# Patient Record
Sex: FEMALE | Race: WHITE | NOT HISPANIC OR LATINO | Employment: OTHER | URBAN - METROPOLITAN AREA
[De-identification: names, ages, dates, MRNs, and addresses within clinical notes are randomized per-mention and may not be internally consistent; named-entity substitution may affect disease eponyms.]

---

## 2017-03-19 ENCOUNTER — HOSPITAL ENCOUNTER (EMERGENCY)
Facility: HOSPITAL | Age: 82
Discharge: HOME | End: 2017-03-19
Attending: EMERGENCY MEDICINE | Admitting: EMERGENCY MEDICINE
Payer: COMMERCIAL

## 2017-03-19 VITALS
HEART RATE: 68 BPM | SYSTOLIC BLOOD PRESSURE: 168 MMHG | WEIGHT: 120 LBS | RESPIRATION RATE: 18 BRPM | OXYGEN SATURATION: 95 % | TEMPERATURE: 97.4 F | DIASTOLIC BLOOD PRESSURE: 87 MMHG | BODY MASS INDEX: 21.95 KG/M2

## 2017-03-19 DIAGNOSIS — R45.1 AGITATION: Primary | ICD-10-CM

## 2017-03-19 DIAGNOSIS — R41.0 DELIRIUM: ICD-10-CM

## 2017-03-19 LAB
BACTERIA UR QL AUTO: NORMAL /HPF
BILIRUB UR QL STRIP: NEGATIVE
CLARITY UR: CLEAR
COLOR UR: YELLOW
GLUCOSE UR STRIP-MCNC: NEGATIVE MG/DL
HGB UR QL STRIP.AUTO: NEGATIVE
KETONES UR STRIP-MCNC: NEGATIVE MG/DL
LEUKOCYTE ESTERASE UR QL STRIP: NEGATIVE
NITRITE UR QL STRIP: NEGATIVE
NON-SQ EPI CELLS URNS QL MICRO: NORMAL /HPF
PH UR STRIP.AUTO: 7 [PH] (ref 4.5–8)
PROT UR STRIP-MCNC: ABNORMAL MG/DL
RBC #/AREA URNS AUTO: NORMAL /HPF
SP GR UR STRIP.AUTO: 1.02 (ref 1–1.03)
UROBILINOGEN UR QL STRIP.AUTO: 2 E.U./DL
WBC #/AREA URNS AUTO: NORMAL /HPF

## 2017-03-19 PROCEDURE — 99284 EMERGENCY DEPT VISIT MOD MDM: CPT

## 2017-03-19 PROCEDURE — 81001 URINALYSIS AUTO W/SCOPE: CPT

## 2017-03-19 PROCEDURE — 81002 URINALYSIS NONAUTO W/O SCOPE: CPT | Performed by: EMERGENCY MEDICINE

## 2017-03-19 RX ORDER — TROLAMINE SALICYLATE 10 G/100G
CREAM TOPICAL AS NEEDED
COMMUNITY
End: 2017-12-22

## 2017-03-19 RX ORDER — ACETAMINOPHEN 325 MG/1
650 TABLET ORAL EVERY 6 HOURS PRN
COMMUNITY
End: 2018-10-02 | Stop reason: SDUPTHER

## 2017-03-19 RX ORDER — AMLODIPINE BESYLATE 2.5 MG/1
2.5 TABLET ORAL 2 TIMES DAILY
COMMUNITY
End: 2017-12-22

## 2017-05-07 ENCOUNTER — HOSPITAL ENCOUNTER (EMERGENCY)
Facility: HOSPITAL | Age: 82
Discharge: NURSING, REHAB OR LONG-TERM CARE | End: 2017-05-07
Attending: EMERGENCY MEDICINE | Admitting: EMERGENCY MEDICINE
Payer: COMMERCIAL

## 2017-05-07 ENCOUNTER — APPOINTMENT (EMERGENCY)
Dept: CT IMAGING | Facility: HOSPITAL | Age: 82
End: 2017-05-07
Payer: COMMERCIAL

## 2017-05-07 ENCOUNTER — APPOINTMENT (EMERGENCY)
Dept: RADIOLOGY | Facility: HOSPITAL | Age: 82
End: 2017-05-07
Payer: COMMERCIAL

## 2017-05-07 VITALS
OXYGEN SATURATION: 93 % | SYSTOLIC BLOOD PRESSURE: 102 MMHG | WEIGHT: 144.62 LBS | BODY MASS INDEX: 26.45 KG/M2 | HEART RATE: 84 BPM | DIASTOLIC BLOOD PRESSURE: 60 MMHG | RESPIRATION RATE: 18 BRPM | TEMPERATURE: 98.1 F

## 2017-05-07 DIAGNOSIS — S00.03XA SCALP HEMATOMA, INITIAL ENCOUNTER: Primary | ICD-10-CM

## 2017-05-07 DIAGNOSIS — S40.019A SHOULDER CONTUSION: ICD-10-CM

## 2017-05-07 PROCEDURE — 72070 X-RAY EXAM THORAC SPINE 2VWS: CPT

## 2017-05-07 PROCEDURE — 70450 CT HEAD/BRAIN W/O DYE: CPT

## 2017-05-07 PROCEDURE — 72125 CT NECK SPINE W/O DYE: CPT

## 2017-05-07 PROCEDURE — 73030 X-RAY EXAM OF SHOULDER: CPT

## 2017-05-07 PROCEDURE — 99284 EMERGENCY DEPT VISIT MOD MDM: CPT

## 2017-05-07 PROCEDURE — 71020 HB CHEST X-RAY 2VW FRONTAL&LATL: CPT

## 2017-05-07 RX ORDER — FUROSEMIDE 40 MG/1
40 TABLET ORAL
COMMUNITY

## 2017-05-07 RX ORDER — ACETAMINOPHEN 325 MG/1
650 TABLET ORAL ONCE
Status: COMPLETED | OUTPATIENT
Start: 2017-05-07 | End: 2017-05-07

## 2017-05-07 RX ORDER — FUROSEMIDE 40 MG/1
40 TABLET ORAL DAILY PRN
COMMUNITY
End: 2017-12-22

## 2017-05-07 RX ADMIN — ACETAMINOPHEN 650 MG: 325 TABLET ORAL at 02:28

## 2017-12-15 ENCOUNTER — GENERIC CONVERSION - ENCOUNTER (OUTPATIENT)
Dept: OTHER | Facility: OTHER | Age: 82
End: 2017-12-15

## 2017-12-22 ENCOUNTER — HOSPITAL ENCOUNTER (OUTPATIENT)
Facility: HOSPITAL | Age: 82
Setting detail: OBSERVATION
Discharge: HOME | End: 2017-12-23
Attending: EMERGENCY MEDICINE | Admitting: HOSPITALIST
Payer: COMMERCIAL

## 2017-12-22 ENCOUNTER — APPOINTMENT (EMERGENCY)
Dept: RADIOLOGY | Facility: HOSPITAL | Age: 82
End: 2017-12-22
Payer: COMMERCIAL

## 2017-12-22 ENCOUNTER — APPOINTMENT (EMERGENCY)
Dept: CT IMAGING | Facility: HOSPITAL | Age: 82
End: 2017-12-22
Payer: COMMERCIAL

## 2017-12-22 DIAGNOSIS — I50.9 CONGESTIVE HEART FAILURE (CHF) (HCC): Primary | ICD-10-CM

## 2017-12-22 LAB
ALBUMIN SERPL BCP-MCNC: 3.8 G/DL (ref 3.5–5)
ALP SERPL-CCNC: 119 U/L (ref 46–116)
ALT SERPL W P-5'-P-CCNC: 27 U/L (ref 12–78)
ANION GAP SERPL CALCULATED.3IONS-SCNC: 6 MMOL/L (ref 4–13)
AST SERPL W P-5'-P-CCNC: 32 U/L (ref 5–45)
ATRIAL RATE: 65 BPM
BASOPHILS # BLD AUTO: 0.03 THOUSANDS/ΜL (ref 0–0.1)
BASOPHILS NFR BLD AUTO: 1 % (ref 0–1)
BILIRUB SERPL-MCNC: 0.6 MG/DL (ref 0.2–1)
BUN SERPL-MCNC: 16 MG/DL (ref 5–25)
CALCIUM SERPL-MCNC: 8.9 MG/DL (ref 8.3–10.1)
CHLORIDE SERPL-SCNC: 103 MMOL/L (ref 100–108)
CO2 SERPL-SCNC: 33 MMOL/L (ref 21–32)
CREAT SERPL-MCNC: 0.89 MG/DL (ref 0.6–1.3)
EOSINOPHIL # BLD AUTO: 0.1 THOUSAND/ΜL (ref 0–0.61)
EOSINOPHIL NFR BLD AUTO: 2 % (ref 0–6)
ERYTHROCYTE [DISTWIDTH] IN BLOOD BY AUTOMATED COUNT: 13.8 % (ref 11.6–15.1)
GFR SERPL CREATININE-BSD FRML MDRD: 57 ML/MIN/1.73SQ M
GLUCOSE SERPL-MCNC: 87 MG/DL (ref 65–140)
HCT VFR BLD AUTO: 39.6 % (ref 34.8–46.1)
HGB BLD-MCNC: 12.6 G/DL (ref 11.5–15.4)
LYMPHOCYTES # BLD AUTO: 0.63 THOUSANDS/ΜL (ref 0.6–4.47)
LYMPHOCYTES NFR BLD AUTO: 13 % (ref 14–44)
MAGNESIUM SERPL-MCNC: 2.3 MG/DL (ref 1.6–2.6)
MCH RBC QN AUTO: 29.9 PG (ref 26.8–34.3)
MCHC RBC AUTO-ENTMCNC: 31.8 G/DL (ref 31.4–37.4)
MCV RBC AUTO: 94 FL (ref 82–98)
MONOCYTES # BLD AUTO: 0.52 THOUSAND/ΜL (ref 0.17–1.22)
MONOCYTES NFR BLD AUTO: 11 % (ref 4–12)
NEUTROPHILS # BLD AUTO: 3.66 THOUSANDS/ΜL (ref 1.85–7.62)
NEUTS SEG NFR BLD AUTO: 73 % (ref 43–75)
NT-PROBNP SERPL-MCNC: 2239 PG/ML
P AXIS: 88 DEGREES
PLATELET # BLD AUTO: 139 THOUSANDS/UL (ref 149–390)
PMV BLD AUTO: 12.4 FL (ref 8.9–12.7)
POTASSIUM SERPL-SCNC: 3.6 MMOL/L (ref 3.5–5.3)
PR INTERVAL: 304 MS
PROT SERPL-MCNC: 7.1 G/DL (ref 6.4–8.2)
QRS AXIS: -25 DEGREES
QRSD INTERVAL: 128 MS
QT INTERVAL: 442 MS
QTC INTERVAL: 459 MS
RBC # BLD AUTO: 4.21 MILLION/UL (ref 3.81–5.12)
SODIUM SERPL-SCNC: 142 MMOL/L (ref 136–145)
T WAVE AXIS: 77 DEGREES
TROPONIN I SERPL-MCNC: 0.03 NG/ML
VENTRICULAR RATE: 65 BPM
WBC # BLD AUTO: 4.94 THOUSAND/UL (ref 4.31–10.16)

## 2017-12-22 PROCEDURE — 84484 ASSAY OF TROPONIN QUANT: CPT | Performed by: EMERGENCY MEDICINE

## 2017-12-22 PROCEDURE — 85025 COMPLETE CBC W/AUTO DIFF WBC: CPT | Performed by: HOSPITALIST

## 2017-12-22 PROCEDURE — 93005 ELECTROCARDIOGRAM TRACING: CPT

## 2017-12-22 PROCEDURE — 96374 THER/PROPH/DIAG INJ IV PUSH: CPT

## 2017-12-22 PROCEDURE — 36415 COLL VENOUS BLD VENIPUNCTURE: CPT | Performed by: EMERGENCY MEDICINE

## 2017-12-22 PROCEDURE — 93005 ELECTROCARDIOGRAM TRACING: CPT | Performed by: EMERGENCY MEDICINE

## 2017-12-22 PROCEDURE — 71020 HB CHEST X-RAY 2VW FRONTAL&LATL: CPT

## 2017-12-22 PROCEDURE — 80053 COMPREHEN METABOLIC PANEL: CPT | Performed by: EMERGENCY MEDICINE

## 2017-12-22 PROCEDURE — 83735 ASSAY OF MAGNESIUM: CPT | Performed by: EMERGENCY MEDICINE

## 2017-12-22 PROCEDURE — 99285 EMERGENCY DEPT VISIT HI MDM: CPT

## 2017-12-22 PROCEDURE — 83880 ASSAY OF NATRIURETIC PEPTIDE: CPT | Performed by: EMERGENCY MEDICINE

## 2017-12-22 PROCEDURE — 70450 CT HEAD/BRAIN W/O DYE: CPT

## 2017-12-22 RX ORDER — FUROSEMIDE 10 MG/ML
40 INJECTION INTRAMUSCULAR; INTRAVENOUS ONCE
Status: COMPLETED | OUTPATIENT
Start: 2017-12-22 | End: 2017-12-22

## 2017-12-22 RX ORDER — POLYVINYL ALCOHOL 14 MG/ML
1 SOLUTION/ DROPS OPHTHALMIC
Status: DISCONTINUED | OUTPATIENT
Start: 2017-12-22 | End: 2017-12-23 | Stop reason: HOSPADM

## 2017-12-22 RX ORDER — ALBUTEROL SULFATE 2.5 MG/3ML
2.5 SOLUTION RESPIRATORY (INHALATION)
Status: DISCONTINUED | OUTPATIENT
Start: 2017-12-22 | End: 2017-12-22

## 2017-12-22 RX ORDER — ALBUTEROL SULFATE 2.5 MG/3ML
2.5 SOLUTION RESPIRATORY (INHALATION) EVERY 6 HOURS PRN
Status: DISCONTINUED | OUTPATIENT
Start: 2017-12-22 | End: 2017-12-23 | Stop reason: HOSPADM

## 2017-12-22 RX ORDER — ACETAMINOPHEN 325 MG/1
650 TABLET ORAL EVERY 6 HOURS PRN
Status: DISCONTINUED | OUTPATIENT
Start: 2017-12-22 | End: 2017-12-23 | Stop reason: HOSPADM

## 2017-12-22 RX ORDER — ASPIRIN 81 MG/1
81 TABLET, CHEWABLE ORAL DAILY
Status: DISCONTINUED | OUTPATIENT
Start: 2017-12-22 | End: 2017-12-23 | Stop reason: HOSPADM

## 2017-12-22 RX ORDER — FUROSEMIDE 40 MG/1
40 TABLET ORAL
Status: DISCONTINUED | OUTPATIENT
Start: 2017-12-22 | End: 2017-12-23

## 2017-12-22 RX ORDER — BUSPIRONE HYDROCHLORIDE 5 MG/1
7.5 TABLET ORAL 2 TIMES DAILY
Status: DISCONTINUED | OUTPATIENT
Start: 2017-12-22 | End: 2017-12-23 | Stop reason: HOSPADM

## 2017-12-22 RX ORDER — BUSPIRONE HYDROCHLORIDE 7.5 MG/1
7.5 TABLET ORAL 2 TIMES DAILY
COMMUNITY

## 2017-12-22 RX ADMIN — BUSPIRONE HYDROCHLORIDE 7.5 MG: 5 TABLET ORAL at 17:34

## 2017-12-22 RX ADMIN — METOPROLOL TARTRATE 12.5 MG: 25 TABLET ORAL at 20:35

## 2017-12-22 RX ADMIN — SERTRALINE HYDROCHLORIDE 50 MG: 50 TABLET ORAL at 11:07

## 2017-12-22 RX ADMIN — BUSPIRONE HYDROCHLORIDE 7.5 MG: 5 TABLET ORAL at 11:07

## 2017-12-22 RX ADMIN — FUROSEMIDE 40 MG: 40 TABLET ORAL at 17:35

## 2017-12-22 RX ADMIN — METOPROLOL TARTRATE 12.5 MG: 25 TABLET ORAL at 11:07

## 2017-12-22 RX ADMIN — ENOXAPARIN SODIUM 40 MG: 40 INJECTION SUBCUTANEOUS at 14:13

## 2017-12-22 RX ADMIN — ASPIRIN 81 MG 81 MG: 81 TABLET ORAL at 11:07

## 2017-12-22 RX ADMIN — FUROSEMIDE 40 MG: 10 INJECTION, SOLUTION INTRAMUSCULAR; INTRAVENOUS at 07:49

## 2017-12-22 NOTE — CASE MANAGEMENT
Initial Clinical Review    Admission: Date/Time/Statement: 12/22/2017  0810 OBSERVATION    Orders Placed This Encounter   Procedures    Place in Observation (expected length of stay for this patient is less than two midnights)     Standing Status:   Standing     Number of Occurrences:   1     Order Specific Question:   Admitting Physician     Answer:   Wilmar De La Torre [[id number]]     Order Specific Question:   Level of Care     Answer:   Med Surg [16]         ED: Date/Time/Mode of Arrival:   ED Arrival Information     Expected Arrival Acuity Means of Arrival Escorted By Service Admission Type    - 12/22/2017 05:12 Urgent Ambulance [facility] General Medicine Urgent    Arrival Complaint    SOB          Chief Complaint:   Chief Complaint   Patient presents with    Breathing Difficulty     Pt arrived to ED via EMS from [facility] where staff found patient in bed and wheezing  EMS gave 2 5mg of albuterol, pt has Hx of CHF       History of Illness:  80 y o  female h/o AoCDHF, AS, mitral stenosis, and dementia  who presents with acute onset of SOB and wheezing this am while at her nursing home  No clear antecedent to symptoms  No obvious sick contacts  No med noncompliance or clear dietary indiscretion  No cough  No fevers or chills  Pt is a poor historian   Reportedly improved with nebulizers en route to ED   +LE edema, but may be improved from days prior per family at bedside    ED Vital Signs:   ED Triage Vitals   Temperature Pulse Respirations Blood Pressure SpO2   12/22/17 0525 12/22/17 0522 12/22/17 0524 12/22/17 0522 12/22/17 0522   97 6 °F (36 4 °C) 64 18 157/70 93 %      Temp Source Heart Rate Source Patient Position - Orthostatic VS BP Location FiO2 (%)   12/22/17 0525 12/22/17 0522 12/22/17 0522 12/22/17 0522 --   Oral Monitor Lying Right arm       Pain Score       12/22/17 0522       No Pain        Wt Readings from Last 1 Encounters:   12/22/17 56 4 kg (124 lb 5 4 oz)       Vital Signs (abnormal): low sat of 92% room air  Exam - frail  JVD  Murmur - harsh JERRY at LUSB  Edema  Abnormal Labs/Diagnostic Test Results:   NT-proBNP 2239  CO2-33  Alkaline phosphatase 119    Ct head - No acute intracranial abnormality  CxR- cardiomegaly    EKG- Sinus rhythm with 1st degree A-V block  Left ventricular hypertrophy with QRS widening and repolarization abnormality  Cannot rule out Septal infarct (cited on or before 21-JAN-2005)  Abnormal ECG  When compared with ECG of 30-OCT-2016 00:43,  T wave inversion no longer evident in Inferior leads  T wave inversion more evident in Lateral leads    ED Treatment:   Medication Administration from 12/22/2017 0512 to 12/22/2017 9269       Date/Time Order Dose Route Action Action by Comments     12/22/2017 0529  EMS REPLENISHMENT MED 0  Does not apply Given to [address], RN      12/22/2017 0749 furosemide (LASIX) injection 40 mg 40 mg Intravenous Given Shaun Ortega, RN           Past Medical/Surgical History:    Active Ambulatory Problems     Diagnosis Date Noted    Essential hypertension 04/08/2016    Shortness of breath 08/20/2016    Lung nodules 08/21/2016    Acute on chronic diastolic heart failure ([facility] [address] ) 08/21/2016    Dementia 08/21/2016    EKG abnormalities 08/26/2016    Chronic fracture 08/26/2016     Resolved Ambulatory Problems     Diagnosis Date Noted    Chest pain 04/08/2016    Recent bereavement 04/08/2016    Anxiety 04/08/2016    Acute bacterial bronchitis 05/29/2016    Congestive heart failure ([facility] [address] ) 05/29/2016    Fall 08/26/2016    Hyperkalemia 08/26/2016    Thrombocytopenia ([facility] [address] ) 08/26/2016    Weight gain 08/26/2016    Contusion of right elbow 08/26/2016    Head contusion 08/26/2016    Contusion of right hip 08/26/2016     Past Medical History:   Diagnosis Date    CHF (congestive heart failure) (HCC)     Dementia     Diverticulitis     Fx ankle     Fx wrist     Hernia     Hypertension     Moderate intellectual disabilities  Osteoarthritis     Osteoarthritis        Admitting Diagnosis: Shortness of breath [R06 02]  Congestive heart failure (CHF) ([facility] [address] ) [I50 9]    Age/Sex: 80 y o  female    Assessment/Plan:   Shortness of breath   Assessment & Plan     Most evidence points to HF  Continue nebulizers       Acute on chronic diastolic heart failure (HCC)   Assessment & Plan     Mildly volume overloaded on exam  S/p 1 dose of lasix 40mg  Will likely resume home dosing this afternoon  I/Os  Daily weights  Telemetry           Essential hypertension   Assessment & Plan     BP stable  Resume home meds        Dementia   Assessment & Plan     Delirium precautions; frequent reorientation          Admission Orders:  TELE  12/22/2017  0810 OBSERVATION  Scheduled Meds:   aspirin 81 mg Oral Daily   busPIRone 7 5 mg Oral BID   enoxaparin 40 mg Subcutaneous Daily   furosemide 40 mg Oral Before Dinner   metoprolol tartrate 12 5 mg Oral Q12H [address]   sertraline 50 mg Oral Daily     Continuous Infusions:    PRN Meds: not used:    acetaminophen    albuterol    polyvinyl alcohol    OTHER ORDERS: scds      Thank you,  [facility] in the [facility] by Sukumar Tran for 2017  Network Utilization Review Department  Phone: [phone]; Fax [phone]  ATTENTION: The Network Utilization Review Department is now centralized for our 7 Facilities  Make a note that we have a new phone and fax numbers for our Department  Please call with any questions or concerns to [phone] and carefully follow the prompts so that you are directed to the right person  All voicemails are confidential  Fax any determinations, approvals, denials, and requests for initial or continue stay review clinical to [phone]  Due to HIGH CALL volume, it would be easier if you could please send faxed requests to expedite your requests and in part, help us provide discharge notifications faster

## 2017-12-22 NOTE — ASSESSMENT & PLAN NOTE
Mildly volume overloaded on exam  S/p 1 dose of lasix 40mg  Will likely resume home dosing this afternoon  I/Os  Daily weights  Telemetry

## 2017-12-22 NOTE — H&P
H&P- Sierra Read 6/19/1927, 80 y o  female MRN: [id number]    Unit/Bed#: -01 Encounter: [id number]    Primary Care Provider: Vi Lara DO   Date and time admitted to hospital: 12/22/2017  5:13 AM    * Shortness of breath   Assessment & Plan    Most evidence points to HF  Continue nebulizers        Acute on chronic diastolic heart failure (HCC)   Assessment & Plan    Mildly volume overloaded on exam  S/p 1 dose of lasix 40mg  Will likely resume home dosing this afternoon  I/Os  Daily weights  Telemetry           Essential hypertension   Assessment & Plan    BP stable  Resume home meds         Dementia   Assessment & Plan    Delirium precautions; frequent reorientation             VTE Prophylaxis: Enoxaparin (Lovenox)  / sequential compression device   Code Status: DNR/DNI   POLST: POLST form is not discussed and not completed at this time  Anticipated Length of Stay:  Patient will be admitted on an Observation basis with an anticipated length of stay of  Less than 2 midnights  Justification for Hospital Stay: SOB of unclear etiology, suspect HF exacerbation     Total Time for Visit, including Counseling / Coordination of Care: 45 minutes  Greater than 50% of this total time spent on direct patient counseling and coordination of care  Chief Complaint:   SOB, wheeze     History of Present Illness:    Sierra Read is a 80 y o  female h/o AoCDHF, AS, mitral stenosis, and dementia  who presents with acute onset of SOB and wheezing this am while at her nursing home  No clear antecedent to symptoms  No obvious sick contacts  No med noncompliance or clear dietary indiscretion  No cough  No fevers or chills  Pt is a poor historian  Reportedly improved with nebulizers en route to ED   +LE edema, but may be improved from days prior per family at bedside    Review of Systems:    Review of Systems   Constitutional: Negative  Negative for chills and fever  HENT: Negative      Respiratory: Positive for shortness of breath and wheezing  Negative for cough and chest tightness  Cardiovascular: Positive for leg swelling  Negative for chest pain and palpitations  Gastrointestinal: Negative  Genitourinary: Negative  Musculoskeletal: Negative  Neurological: Negative  Psychiatric/Behavioral: Negative  Past Medical and Surgical History:     Past Medical History:   Diagnosis Date    CHF [facility]congestive heart failure) ([facility] [address] )     Dementia     Diverticulitis     Fx ankle     Fx wrist     Hernia     Hypertension     Moderate intellectual disabilities     Osteoarthritis        Past Surgical History:   Procedure Laterality Date    ABDOMINAL SURGERY         Meds/Allergies:    Prior to Admission medications    Medication Sig Start Date End Date Taking? Authorizing Provider   acetaminophen (TYLENOL) 325 mg tablet Take 650 mg by mouth every 6 (six) hours as needed for mild pain   Yes Historical Provider, MD   aspirin 81 mg chewable tablet Chew 81 mg daily  Yes Historical Provider, MD   busPIRone (BUSPAR) 7 5 mg tablet Take 7 5 mg by mouth 2 (two) times a day   Yes Historical Provider, MD   furosemide (LASIX) 20 mg tablet Take 20 mg by mouth daily     Yes Historical Provider, MD   furosemide (LASIX) 40 mg tablet Take 40 mg by mouth daily before dinner     Yes Historical Provider, MD   Polyvinyl Alcohol (LUBRICANT DROPS OP) Apply 1 drop to eye 3 (three) times a day as needed     Yes Historical Provider, MD   sertraline (ZOLOFT) 50 mg tablet Take 50 mg by mouth daily   Yes Historical Provider, MD   amLODIPine (NORVASC) 2 5 mg tablet Take 2 5 mg by mouth 2 (two) times a day Hold if systolic blood pressure is less than 140   12/22/17  Historical Provider, MD   docusate sodium (COLACE) 100 mg capsule Take 100 mg by mouth 2 (two) times a day  12/22/17  Historical Provider, MD   escitalopram (LEXAPRO) 10 mg tablet Take 20 mg by mouth daily      12/22/17  Historical Provider, MD   furosemide (LASIX) 40 mg tablet Take 40 mg by mouth daily as needed  12/22/17  Historical Provider, MD   menthol-zinc oxide (CALMOSEPTINE) 0 44-20 6 % OINT Apply topically 2 (two) times a day  12/22/17  Historical Provider, MD   trolamine salicylate (ASPERCREME) 10 % cream Apply topically as needed for muscle/joint pain  12/22/17  Historical Provider, MD     I have reviewed home medications using allscripts  Allergies: No Known Allergies    Social History:     Marital Status:    Patient Pre-hospital Living Situation: lives at nursing home   Substance Use History:   History   Alcohol Use No     History   Smoking Status    Never Smoker   Smokeless Tobacco    Never Used     History   Drug Use No       Family History:    non-contributory    Physical Exam:     Vitals:   Blood Pressure: 136/68 (12/22/17 0800)  Pulse: 64 (12/22/17 0815)  Temperature: 97 6 °F (36 4 °C) (12/22/17 0525)  Temp Source: Oral (12/22/17 0525)  Respirations: 18 (12/22/17 0524)  Weight - Scale: 56 4 kg (124 lb 5 4 oz) (12/22/17 0522)  SpO2: 96 % (12/22/17 0815)    Physical Exam   Constitutional: She appears well-developed  No distress  Frail elderly woman in NAD   HENT:   Head: Normocephalic and atraumatic  Mouth/Throat: Oropharynx is clear and moist  No oropharyngeal exudate  Neck: JVD present  Cardiovascular: Normal rate and regular rhythm  Murmur (harsh JERRY at LUSB) heard  Pulmonary/Chest: She has no wheezes  She has no rales  Effort was poor, but no discrete abnormal breath sounds    Abdominal: Soft  Bowel sounds are normal  She exhibits no distension  There is no tenderness  There is no rebound and no guarding  Musculoskeletal: She exhibits edema (trace b/l )  Neurological: She is alert  Skin: Skin is warm and dry  She is not diaphoretic  Psychiatric: She has a normal mood and affect  Her behavior is normal    Nursing note and vitals reviewed  Additional Data:     Lab Results: I have personally reviewed pertinent reports  Results from last 7 days  Lab Units 12/22/17  0638   SODIUM mmol/L 142   POTASSIUM mmol/L 3 6   CHLORIDE mmol/L 103   CO2 mmol/L 33*   BUN mg/dL 16   CREATININE mg/dL 0 89   CALCIUM mg/dL 8 9   TOTAL PROTEIN g/dL 7 1   BILIRUBIN TOTAL mg/dL 0 60   ALK PHOS U/L 119*   ALT U/L 27   AST U/L 32   GLUCOSE RANDOM mg/dL 87           Imaging: I have personally reviewed pertinent reports  Xr Chest 2 Views    Result Date: 12/22/2017  Narrative: CHEST INDICATION:  Shortness of breath  Wheezing  COMPARISON:  5/7/2017  VIEWS:  Frontal and lateral projections IMAGES:  2 FINDINGS:     Cardiomegaly is unchanged  Dense mitral annulus calcification again noted  The lungs are clear  No pneumothorax or pleural effusion  Stable moderate compression deformity of a lower thoracic vertebral body, and increased thoracic kyphosis  Impression: No acute pulmonary disease  Cardiomegaly  Workstation performed: [id number]     Ct Head Without Contrast    Result Date: 12/22/2017  Narrative: CT BRAIN - WITHOUT CONTRAST INDICATION:  Vertigo  COMPARISON:  5/7/2017 TECHNIQUE:  CT examination of the brain was performed  In addition to axial images, coronal reformatted images were created and submitted for interpretation  Radiation dose length product (DLP) for this visit:  1324 mGy-cm   This examination, like all CT scans performed in the [facility], was performed utilizing techniques to minimize radiation dose exposure, including the use of iterative reconstruction and automated exposure control  IMAGE QUALITY:  Diagnostic  FINDINGS:  PARENCHYMA:  Decreased attenuation is noted in the supratentorial white matter demonstrating an appearance most consistent with mild microangiopathic change  No intracranial mass, mass effect or midline shift  No CT signs of acute infarction  There is no parenchymal hemorrhage  VENTRICLES AND EXTRA-AXIAL SPACES:  Age-appropriate volume loss is noted  No hydrocephalus  VISUALIZED ORBITS AND PARANASAL SINUSES:  Unremarkable  CALVARIUM AND EXTRACRANIAL SOFT TISSUES:   Normal      Impression: No acute intracranial abnormality  Workstation performed: [id number]       EKG, Pathology, and Other Studies Reviewed on Admission:   · EKG: sinus rhythm at 68    Allscripts / Epic Records Reviewed: Yes     ** Please Note: This note has been constructed using a voice recognition system   **

## 2017-12-22 NOTE — ED PROVIDER NOTES
History  Chief Complaint   Patient presents with    Breathing Difficulty     Pt arrived to ED via EMS [facility] where staff found patient in bed and wheezing  EMS gave 2 5mg of albuterol, pt has Hx of CHF     Pt in ER with c/o resp distress  Per daughters, pt was at a different Nh, and her lasix was discontinued for 6mths  Pt recently moved to a new Nh, and restarted her meds on Monday  Pt was given a bath by staff, became dyspneic afterwards  Medics were called and pt was given a neb tx enroute  By the time of my initial eval, pt's symptoms had improved  Pt has a hx of dementia and is a poor historian            Prior to Admission Medications   Prescriptions Last Dose Informant Patient Reported? Taking? Polyvinyl Alcohol (LUBRICANT DROPS OP)   Yes Yes   Sig: Apply 1 drop to eye 3 (three) times a day as needed     acetaminophen (TYLENOL) 325 mg tablet   Yes Yes   Sig: Take 650 mg by mouth every 6 (six) hours as needed for mild pain   aspirin 81 mg chewable tablet   Yes Yes   Sig: Chew 81 mg daily  busPIRone (BUSPAR) 7 5 mg tablet   Yes Yes   Sig: Take 7 5 mg by mouth 2 (two) times a day   furosemide (LASIX) 20 mg tablet   Yes Yes   Sig: Take 20 mg by mouth daily     furosemide (LASIX) 40 mg tablet   Yes Yes   Sig: Take 40 mg by mouth daily before dinner     sertraline (ZOLOFT) 50 mg tablet   Yes Yes   Sig: Take 50 mg by mouth daily      Facility-Administered Medications: None       Past Medical History:   Diagnosis Date    CHF (congestive heart failure) (HCC)     Dementia     Diverticulitis     Fx ankle     Fx wrist     Hernia     Hypertension     Moderate intellectual disabilities     Osteoarthritis     Osteoarthritis        Past Surgical History:   Procedure Laterality Date    ABDOMINAL SURGERY         History reviewed  No pertinent family history  I have reviewed and agree with the history as documented      Social History   Substance Use Topics    Smoking status: Never Smoker    Smokeless tobacco: Never Used    Alcohol use No        Review of Systems   Unable to perform ROS: Dementia   Respiratory: Positive for shortness of breath and wheezing  Negative for cough  Cardiovascular: Positive for leg swelling  Negative for chest pain  Physical Exam  ED Triage Vitals   Temperature Pulse Respirations Blood Pressure SpO2   12/22/17 0525 12/22/17 0522 12/22/17 0524 12/22/17 0522 12/22/17 0522   97 6 °F (36 4 °C) 64 18 157/70 93 %      Temp Source Heart Rate Source Patient Position - Orthostatic VS BP Location FiO2 (%)   12/22/17 0525 12/22/17 0522 12/22/17 0522 12/22/17 0522 --   Oral Monitor Lying Right arm       Pain Score       12/22/17 0522       No Pain           Orthostatic Vital Signs  Vitals:    12/22/17 0853 12/22/17 1618 12/22/17 2300 12/23/17 0700   BP: 143/69 139/58 98/53 140/63   Pulse: 67 66 60 (!) 52   Patient Position - Orthostatic VS:  Lying Lying Lying       Physical Exam   Constitutional: She appears well-developed and well-nourished  HENT:   Head: Normocephalic and atraumatic  Cardiovascular: Normal rate and regular rhythm  Pulmonary/Chest: She is in respiratory distress  She has decreased breath sounds in the right lower field and the left lower field  Abdominal: Soft  Bowel sounds are normal    Musculoskeletal: She exhibits edema  She exhibits no tenderness or deformity  Neurological: She is alert  No cranial nerve deficit  Skin: Skin is dry  Nursing note and vitals reviewed        ED Medications  Medications    EMS REPLENISHMENT MED ( Does not apply Given to EMS 12/22/17 0529)   furosemide (LASIX) injection 40 mg (40 mg Intravenous Given 12/22/17 0749)       Diagnostic Studies  Results Reviewed     Procedure Component Value Units Date/Time    B-type natriuretic peptide [[id number]]  (Abnormal) Collected:  12/22/17 0954    Lab Status:  Final result Specimen:  Blood from Arm, Right Updated:  12/22/17 0715     NT-proBNP 2,239 (H) pg/mL     Magnesium [[id number]]  (Normal) Collected:  12/22/17 5098    Lab Status:  Final result Specimen:  Blood from Arm, Right Updated:  12/22/17 0715     Magnesium 2 3 mg/dL     Comprehensive metabolic panel [[id number]]  (Abnormal) Collected:  12/22/17 7527    Lab Status:  Final result Specimen:  Blood from Arm, Right Updated:  12/22/17 4637     Sodium 142 mmol/L      Potassium 3 6 mmol/L      Chloride 103 mmol/L      CO2 33 (H) mmol/L      Anion Gap 6 mmol/L      BUN 16 mg/dL      Creatinine 0 89 mg/dL      Glucose 87 mg/dL      Calcium 8 9 mg/dL      AST 32 U/L      ALT 27 U/L      Alkaline Phosphatase 119 (H) U/L      Total Protein 7 1 g/dL      Albumin 3 8 g/dL      Total Bilirubin 0 60 mg/dL      eGFR 57 ml/min/1 73sq m     Narrative:         National Kidney Disease Education Program recommendations are as follows:  GFR calculation is accurate only with a steady state creatinine  Chronic Kidney disease less than 60 ml/min/1 73 sq  meters  Kidney failure less than 15 ml/min/1 73 sq  meters  Troponin I [[id number]]  (Normal) Collected:  12/22/17 3370    Lab Status:  Final result Specimen:  Blood from Arm, Right Updated:  12/22/17 0708     Troponin I 0 03 ng/mL     Narrative:         [employer] Chemistry analyzer 99% cutoff is > 0 04 ng/mL in network labs    o cTnI 99% cutoff is useful only when applied to patients in the clinical setting of myocardial ischemia  o cTnI 99% cutoff should be interpreted in the context of clinical history, ECG findings and possibly cardiac imaging to establish correct diagnosis  o cTnI 99% cutoff may be suggestive but clearly not indicative of a coronary event without the clinical setting of myocardial ischemia  XR chest 2 views   ED Interpretation by Paulette Najera DO (12/22 0702)   Cardiomegaly, mild pulm vasc congestion      Final Result by Valentino Goodwin MD (12/22 7399)      No acute pulmonary disease  Cardiomegaly           Workstation performed: [id number]         CT head without contrast   Final Result by Mohsen Ramirez MD (12/22 1722)      No acute intracranial abnormality  Workstation performed: [id number]                    Procedures  ECG 12 Lead Documentation  Date/Time: 12/22/2017 6:30 AM  Performed by: Leif De by: Carlene Nissen     Indications / Diagnosis:  Sob  ECG reviewed by me, the ED Provider: yes    Patient location:  ED and bedside  Previous ECG:     Previous ECG:  Compared to current    Similarity:  No change  Interpretation:     Interpretation: abnormal    Rate:     ECG rate:  65bpm    ECG rate assessment: normal    Rhythm:     Rhythm: sinus rhythm and A-V block    ST segments:     ST segments:  Non-specific           Phone Contacts  ED Phone Contact    ED Course  ED Course                                MDM  CritCare Time    Disposition  Final diagnoses:   Congestive heart failure (CHF) ([address] )     Time reflects when diagnosis was documented in both MDM as applicable and the Disposition within this note     Time User Action Codes Description Comment    12/22/2017  8:14 AM Xena LAI Add [I50 9] Congestive heart failure (CHF) [facility])       ED Disposition     ED Disposition Condition Comment    Admit  Case was discussed with Adhikari Medico and the patient's admission status was agreed to be Admission Status: observation status to the service of Dr Briana Rosario MD Documentation    [address] Name, [address] [place]      RN Documentation    [address] Name, [address] [place]      Follow-up Information     Follow up With Specialties Details Why Contact Reema Sanders DO Family Medicine Follow up  [address] [facility]  [phone]          Discharge Medication List as of 12/23/2017 11:08 AM      START taking these medications    Details   metoprolol tartrate (LOPRESSOR) 25 mg tablet Take 0 5 tablets by mouth every 12 (twelve) hours, Starting Sat 12/23/2017, Print         CONTINUE these medications which have CHANGED    Details   albuterol (2 5 mg/3 mL) 0 083 % nebulizer solution Take 3 mL by nebulization every 6 (six) hours as needed for shortness of breath, Starting Sat 12/23/2017, Print         CONTINUE these medications which have NOT CHANGED    Details   acetaminophen (TYLENOL) 325 mg tablet Take 650 mg by mouth every 6 (six) hours as needed for mild pain, Until Discontinued, Historical Med      aspirin 81 mg chewable tablet Chew 81 mg daily  , Until Discontinued, Historical Med      busPIRone (BUSPAR) 7 5 mg tablet Take 7 5 mg by mouth 2 (two) times a day, Historical Med      !! furosemide (LASIX) 20 mg tablet Take 20 mg by mouth daily  , Historical Med      !! furosemide (LASIX) 40 mg tablet Take 40 mg by mouth daily before dinner  , Historical Med      Polyvinyl Alcohol (LUBRICANT DROPS OP) Apply 1 drop to eye 3 (three) times a day as needed  , Historical Med      sertraline (ZOLOFT) 50 mg tablet Take 50 mg by mouth daily, Historical Med       !! - Potential duplicate medications found  Please discuss with provider            Outpatient Discharge Orders  Discharge Condtion:  Stabilized     Patient Aware of Diagnosis: No     Free of Communicable Disease:   Yes         ED Provider  Electronically Signed by           Gaby Irvin DO  12/26/17 7682

## 2017-12-22 NOTE — ED NOTES
Daughter- Cirotatiana Hurtadobs phone number [phone]     Timoviri Trey, [address] [place] [address]  12/22/17 9643

## 2017-12-23 VITALS
SYSTOLIC BLOOD PRESSURE: 140 MMHG | OXYGEN SATURATION: 97 % | WEIGHT: 125.22 LBS | DIASTOLIC BLOOD PRESSURE: 63 MMHG | BODY MASS INDEX: 23.04 KG/M2 | RESPIRATION RATE: 18 BRPM | HEIGHT: 62 IN | TEMPERATURE: 98.4 F | HEART RATE: 52 BPM

## 2017-12-23 LAB
ANION GAP SERPL CALCULATED.3IONS-SCNC: 6 MMOL/L (ref 4–13)
BASOPHILS # BLD AUTO: 0.05 THOUSANDS/ΜL (ref 0–0.1)
BASOPHILS NFR BLD AUTO: 1 % (ref 0–1)
BUN SERPL-MCNC: 17 MG/DL (ref 5–25)
CALCIUM SERPL-MCNC: 8.7 MG/DL (ref 8.3–10.1)
CHLORIDE SERPL-SCNC: 103 MMOL/L (ref 100–108)
CO2 SERPL-SCNC: 31 MMOL/L (ref 21–32)
CREAT SERPL-MCNC: 1 MG/DL (ref 0.6–1.3)
EOSINOPHIL # BLD AUTO: 0.13 THOUSAND/ΜL (ref 0–0.61)
EOSINOPHIL NFR BLD AUTO: 2 % (ref 0–6)
ERYTHROCYTE [DISTWIDTH] IN BLOOD BY AUTOMATED COUNT: 14.1 % (ref 11.6–15.1)
GFR SERPL CREATININE-BSD FRML MDRD: 50 ML/MIN/1.73SQ M
GLUCOSE P FAST SERPL-MCNC: 90 MG/DL (ref 65–99)
GLUCOSE SERPL-MCNC: 90 MG/DL (ref 65–140)
HCT VFR BLD AUTO: 42.1 % (ref 34.8–46.1)
HGB BLD-MCNC: 13.4 G/DL (ref 11.5–15.4)
LYMPHOCYTES # BLD AUTO: 1.2 THOUSANDS/ΜL (ref 0.6–4.47)
LYMPHOCYTES NFR BLD AUTO: 20 % (ref 14–44)
MAGNESIUM SERPL-MCNC: 2.4 MG/DL (ref 1.6–2.6)
MCH RBC QN AUTO: 30 PG (ref 26.8–34.3)
MCHC RBC AUTO-ENTMCNC: 31.8 G/DL (ref 31.4–37.4)
MCV RBC AUTO: 94 FL (ref 82–98)
MONOCYTES # BLD AUTO: 0.74 THOUSAND/ΜL (ref 0.17–1.22)
MONOCYTES NFR BLD AUTO: 13 % (ref 4–12)
NEUTROPHILS # BLD AUTO: 3.8 THOUSANDS/ΜL (ref 1.85–7.62)
NEUTS SEG NFR BLD AUTO: 64 % (ref 43–75)
PHOSPHATE SERPL-MCNC: 3.7 MG/DL (ref 2.3–4.1)
PLATELET # BLD AUTO: 131 THOUSANDS/UL (ref 149–390)
PMV BLD AUTO: 12.6 FL (ref 8.9–12.7)
POTASSIUM SERPL-SCNC: 3.6 MMOL/L (ref 3.5–5.3)
RBC # BLD AUTO: 4.46 MILLION/UL (ref 3.81–5.12)
SODIUM SERPL-SCNC: 140 MMOL/L (ref 136–145)
WBC # BLD AUTO: 5.92 THOUSAND/UL (ref 4.31–10.16)

## 2017-12-23 PROCEDURE — 83735 ASSAY OF MAGNESIUM: CPT | Performed by: HOSPITALIST

## 2017-12-23 PROCEDURE — 80048 BASIC METABOLIC PNL TOTAL CA: CPT | Performed by: HOSPITALIST

## 2017-12-23 PROCEDURE — 85025 COMPLETE CBC W/AUTO DIFF WBC: CPT | Performed by: HOSPITALIST

## 2017-12-23 PROCEDURE — 84100 ASSAY OF PHOSPHORUS: CPT | Performed by: HOSPITALIST

## 2017-12-23 RX ORDER — ALBUTEROL SULFATE 2.5 MG/3ML
2.5 SOLUTION RESPIRATORY (INHALATION) EVERY 6 HOURS PRN
Qty: 75 ML | Refills: 0 | Status: SHIPPED | OUTPATIENT
Start: 2017-12-23

## 2017-12-23 RX ORDER — ALBUTEROL SULFATE 2.5 MG/3ML
2.5 SOLUTION RESPIRATORY (INHALATION) EVERY 6 HOURS PRN
Qty: 75 ML | Refills: 0 | Status: SHIPPED | OUTPATIENT
Start: 2017-12-23 | End: 2017-12-23

## 2017-12-23 RX ORDER — FUROSEMIDE 40 MG/1
40 TABLET ORAL DAILY
Status: DISCONTINUED | OUTPATIENT
Start: 2017-12-23 | End: 2017-12-23 | Stop reason: HOSPADM

## 2017-12-23 RX ADMIN — BUSPIRONE HYDROCHLORIDE 7.5 MG: 5 TABLET ORAL at 08:29

## 2017-12-23 RX ADMIN — SERTRALINE HYDROCHLORIDE 50 MG: 50 TABLET ORAL at 08:29

## 2017-12-23 RX ADMIN — FUROSEMIDE 40 MG: 40 TABLET ORAL at 08:36

## 2017-12-23 RX ADMIN — ASPIRIN 81 MG 81 MG: 81 TABLET ORAL at 08:30

## 2017-12-23 RX ADMIN — ENOXAPARIN SODIUM 40 MG: 40 INJECTION SUBCUTANEOUS at 08:29

## 2017-12-23 RX ADMIN — METOPROLOL TARTRATE 12.5 MG: 25 TABLET ORAL at 08:30

## 2017-12-23 NOTE — PROGRESS NOTES
Pt's daughter Jadyn Zamarripa called and left two numbers for contact in case of an emergency:    [place]: [address]: [phone]

## 2017-12-23 NOTE — DISCHARGE INSTRUCTIONS
You were hospitalized due to SOB and wheezing  You were cared for on the 3rd floor under the service of Kriss Williamson MD with the [facility] who covers for Dominguez Cardoza, DO while you were hospitalized  If you have any questions or concerns related to this hospitalization, you may contact us at [phone]  For follow up, we recommend that you follow up with your primary care physician  We also provide the following instructions / recommendations at discharge:     · We prescribed a nebulizer to be used as needed for wheeziness     · There are no other changes to the medications

## 2017-12-23 NOTE — DISCHARGE SUMMARY
Transition of Care Discharge Summary - [address] [facility]    Patient Information: Stephanie Mcduffie 80 y o  female MRN: [id number]  Unit/Bed#: -01 Encounter: [id number]    Discharging Physician / Practitioner: Delmy Denise MD  PCP: Manas Melendrez DO  Admission Date: 12/22/2017  Discharge Date: 12/23/17    Disposition:      Short Term Rehab or SNF at [address] [place] [address] SNF:   · Not Applicable to this Patient - Not Applicable to this Patient    Reason for Admission: SOB    Discharge Diagnoses:     Principal Problem:    Shortness of breath  Active Problems:    Acute on chronic diastolic heart failure ([address] )    Essential hypertension    Dementia  Resolved Problems:    * No resolved hospital problems  *      Consultations During Hospital Stay:  · none    Procedures Performed:     CXR:   IMPRESSION:     No acute pulmonary disease  Cardiomegaly  Medication Adjustments and Discharge Medications:  · Summary of Medication Adjustments made as a result of this hospitalization:   · Added albuterol nebulizer as needed q6hr  · Medication Dosing Tapers - Please refer to Discharge Medication List for details on any medication dosing tapers (if applicable to patient)  · Medications being temporarily held (include recommended restart time): none  · Discharge Medication List: See after visit summary for reconciled discharge medications  Wound Care Recommendations:  When applicable, please see wound care section of After Visit Summary      Diet Recommendations at Discharge:  Diet -        Diet Orders            Start     Ordered    12/22/17 1102  Room Service  Once     Question:  Type of Service  Answer:  Room Service - Appropriate with Assistance    12/22/17 1101    12/22/17 1016  Diet Cardiac; Cardiac TLC 2 3 GM NA; Cardiac Step 1  Diet effective now     Question Answer Comment   Diet Type Cardiac    Cardiac Cardiac TLC 2 3 GM NA    Other Restriction(s): Cardiac Step 1    RD to adjust diet per protocol? Yes        12/22/17 1015          Instructions for any Catheters / Lines Present at Discharge (including removal date, if applicable): N/A     Significant Findings / Test Results:     · None     Incidental Findings:   · None      Test Results Pending at Discharge (will require follow up):   · none     Outpatient Tests Requested:  · none    Complications:  None     Hospital Course:     Arla Kussmaul is a 80 y o  female patient h/o diastolic HF who originally presented to the hospital on 12/22/2017 due to Sob and wheezes noted at nursing home  No clear etiology, but possibly due to mild heart failuer exacerbation  Pt was given 1 dose of IV lasix in ED with immediate improvement  Pt was continued on her oral medications on discharge  Pt also noted subjective improvement in her symptoms with albuterol nebulizers  She was discharged in stable condition with no changes to her medication regimen other than adding albuterol  Condition at Discharge: fair     Discharge Day Visit / Exam:     Subjective:  Feels well  No SOB or cough  Has no complaints   Vitals: Blood Pressure: 140/63 (12/23/17 0700)  Pulse: (!) 52 (12/23/17 0700)  Temperature: 98 4 °F (36 9 °C) (12/23/17 0700)  Temp Source: Oral (12/23/17 0700)  Respirations: 18 (12/23/17 0700)  Height: 5' 2" (157 5 cm) (12/22/17 1142)  Weight - Scale: 56 8 kg (125 lb 3 5 oz) (12/23/17 0600)  SpO2: 97 % (12/23/17 0700)  Exam:   Physical Exam   Constitutional: No distress  HENT:   Mouth/Throat: No oropharyngeal exudate  Cardiovascular: Regular rhythm  Bradycardia present  Pulmonary/Chest: Effort normal and breath sounds normal  No respiratory distress  She has no wheezes  She has no rales  Abdominal: Soft  Bowel sounds are normal  She exhibits no distension  There is no tenderness  Musculoskeletal: Normal range of motion  She exhibits no edema or tenderness  Neurological: She is alert  Skin: Skin is warm and dry   She is not diaphoretic  Nursing note and vitals reviewed  Discussion with Family: discussed with dtr     Goals of Care Discussions:  · Code Status at Discharge: Level 3 - DNAR and DNI  · Were there any Goals of Care Discussions during Hospitalization?: Yes  · Results of any General Goals of Care Discussions: N/A   · POLST Completed: No   · If POLST Completed, Summary of POLST Agreement Provided Here:    · OK to Rehospitalize if Needed? Yes    Discharge instructions/Information to patient and family:   See after visit summary section titled Discharge Instructions for information provided to patient and family  Planned Readmission: none      Discharge Statement:  I spent 35 minutes discharging the patient  This time was spent on the day of discharge  I had direct contact with the patient on the day of discharge  Greater than 50% of the total time was spent examining patient, answering all patient questions, arranging and discussing plan of care with patient as well as directly providing post-discharge instructions  Additional time then spent on discharge activities      ** Please Note: This note has been constructed using a voice recognition system **

## 2017-12-23 NOTE — SOCIAL WORK
CM was notified patient was ready for discharge today back to her [facility]  CM contacted [facility] and they were expecting her back  CM spoke with patient's daughter eJromy who reported she and her sister would come to transport her back around 12:00 pm   Once they were here to pick her up family decided it would be best for [facility] transport to be set up for patient  AB was able to set up a [facility] via [employer] for 4:30 PM   CM discussed with family the out of pocket expense of $40-$70 and they were agreeable to this  No further CM discharge needs were discussed or identified

## 2018-01-23 NOTE — MISCELLANEOUS
[facility] Nursing Staff: There was some confusion at [facility]  She was not being given the Metoprolol Tartrate 25 mg medication - instructions: take 0 05 tablet twice daily  Enclosed is her current medication list and a script for the Metoprolol Tartrate  Please weigh her daily and take her blood pressure  Fax this information to Dr ALVARADO AND [facility] office once a week at [phone]  Any questions, please contact [facility], Nursing Staff, at [phone]  Thank you  Electronically signed by:Barbra Carrero   Dec 15 2017 12:39PM EST            Electronically signed by:Francisco J WILKINS    Dec 15 2017 12:59PM EST

## 2018-06-08 ENCOUNTER — HOSPITAL ENCOUNTER (EMERGENCY)
Facility: HOSPITAL | Age: 83
Discharge: HOME | End: 2018-06-08
Attending: EMERGENCY MEDICINE | Admitting: EMERGENCY MEDICINE
Payer: COMMERCIAL

## 2018-06-08 ENCOUNTER — APPOINTMENT (EMERGENCY)
Dept: CT IMAGING | Facility: HOSPITAL | Age: 83
End: 2018-06-08
Payer: COMMERCIAL

## 2018-06-08 VITALS
HEART RATE: 66 BPM | BODY MASS INDEX: 20.24 KG/M2 | DIASTOLIC BLOOD PRESSURE: 63 MMHG | TEMPERATURE: 98 F | SYSTOLIC BLOOD PRESSURE: 129 MMHG | RESPIRATION RATE: 16 BRPM | WEIGHT: 110.67 LBS | OXYGEN SATURATION: 96 %

## 2018-06-08 DIAGNOSIS — S09.90XA CLOSED HEAD INJURY: Primary | ICD-10-CM

## 2018-06-08 DIAGNOSIS — S00.83XA TRAUMATIC HEMATOMA OF FOREHEAD: ICD-10-CM

## 2018-06-08 PROCEDURE — 72125 CT NECK SPINE W/O DYE: CPT

## 2018-06-08 PROCEDURE — 70450 CT HEAD/BRAIN W/O DYE: CPT

## 2018-06-08 PROCEDURE — 99284 EMERGENCY DEPT VISIT MOD MDM: CPT

## 2018-06-08 NOTE — DISCHARGE INSTRUCTIONS
Contusion in Adults   WHAT YOU NEED TO KNOW:   A contusion is a bruise that appears on your skin after an injury  A bruise happens when small blood vessels tear but skin does not  When blood vessels tear, blood leaks into nearby tissue, such as soft tissue or muscle  DISCHARGE INSTRUCTIONS:   Return to the emergency department if:   · You have new trouble moving the injured area  · You have tingling or numbness in or near the injured area  · Your hand or foot below the bruise gets cold or turns pale  Contact your healthcare provider if:   · You find a new lump in the injured area  · Your symptoms do not improve with treatment after 4 to 5 days  · You have questions or concerns about your condition or care  Medicines: You may need any of the following:  · NSAIDs  help decrease swelling and pain or fever  This medicine is available with or without a doctor's order  NSAIDs can cause stomach bleeding or kidney problems in certain people  If you take blood thinner medicine, always ask your healthcare provider if NSAIDs are safe for you  Always read the medicine label and follow directions  · Prescription pain medicine  may be given  Do not wait until the pain is severe before you take your medicine  · Take your medicine as directed  Contact your healthcare provider if you think your medicine is not helping or if you have side effects  Tell him of her if you are allergic to any medicine  Keep a list of the medicines, vitamins, and herbs you take  Include the amounts, and when and why you take them  Bring the list or the pill bottles to follow-up visits  Carry your medicine list with you in case of an emergency  Follow up with your healthcare provider as directed: You may need to return within a week to check your injury again  Write down your questions so you remember to ask them during your visits    Help a contusion heal:   · Rest the injured area  or use it less than usual  If you bruised your leg or foot, you may need crutches or a cane to help you walk  This will help you keep weight off your injured body part  · Apply ice  to decrease swelling and pain  Ice may also help prevent tissue damage  Use an ice pack, or put crushed ice in a plastic bag  Cover it with a towel and place it on your bruise for 15 to 20 minutes every hour or as directed  · Use compression  to support the area and decrease swelling  Wrap an elastic bandage around the area over the bruised muscle  Make sure the bandage is not too tight  You should be able to fit 1 finger between the bandage and your skin  · Elevate (raise) your injured body part  above the level of your heart to help decrease pain and swelling  Use pillows, blankets, or rolled towels to elevate the area as often as you can  · Do not drink alcohol  as directed  Alcohol may slow healing  · Do not stretch injured muscles  right after your injury  Ask your healthcare provider when and how you may safely stretch after your injury  Gentle stretches can help increase your flexibility  · Do not massage the area or put heating pads  on the bruise right after your injury  Heat and massage may slow healing  Your healthcare provider may tell you to apply heat after several days  At that time, heat will start to help the injury heal   Prevent another contusion:   · Stretch and warm up before you play sports or exercise  · Wear protective gear when you play sports  Examples are shin guards and padding  · If you begin a new physical activity, start slowly to give your body a chance to adjust   © 2017 [employer] Information is for End User's use only and may not be sold, redistributed or otherwise used for commercial purposes  All illustrations and images included in CareNotes® are the copyrighted property of [employer]  or [employer]  The above information is an  only   It is not intended as medical advice for individual conditions or treatments  Talk to your doctor, nurse or pharmacist before following any medical regimen to see if it is safe and effective for you  Head Injury   WHAT YOU NEED TO KNOW:   A head injury is most often caused by a blow to the head  This may occur from a fall, bicycle injury, sports injury, being struck in the head, or a motor vehicle accident  DISCHARGE INSTRUCTIONS:   Call 911 or have someone else call for any of the following:   · You cannot be woken  · You have a seizure  · You stop responding to others or you faint  · You have blurry or double vision  · Your speech becomes slurred or confused  · You have arm or leg weakness, loss of feeling, or new problems with coordination  · Your pupils are larger than usual or one pupil is a different size than the other  · You have blood or clear fluid coming out of your ears or nose  Return to the emergency department if:   · You have repeated or forceful vomiting  · You feel confused  · Your headache gets worse or becomes severe  · You or someone caring for you notices that you are harder to wake than usual   Contact your healthcare provider if:   · Your symptoms last longer than 6 weeks after the injury  · You have questions or concerns about your condition or care  Medicines:   · Acetaminophen  decreases pain  Acetaminophen is available without a doctor's order  Ask how much to take and how often to take it  Follow directions  Acetaminophen can cause liver damage if not taken correctly  · Take your medicine as directed  Contact your healthcare provider if you think your medicine is not helping or if you have side effects  Tell him or her if you are allergic to any medicine  Keep a list of the medicines, vitamins, and herbs you take  Include the amounts, and when and why you take them  Bring the list or the pill bottles to follow-up visits   Carry your medicine list with you in case of an emergency  Self-care:   · Rest  or do quiet activities for 24 to 48 hours  Limit your time watching TV, using the computer, or doing tasks that require a lot of thinking  Slowly return to your normal activities as directed  Do not play sports or do activities that may cause you to get hit in the head  Ask your healthcare provider when you can return to sports  · Apply ice  on your head for 15 to 20 minutes every hour or as directed  Use an ice pack, or put crushed ice in a plastic bag  Cover it with a towel before you apply it to your skin  Ice helps prevent tissue damage and decreases swelling and pain  · Have someone stay with you for 24 hours  or as directed  This person can monitor you for complications and call [phone]  When you are awake the person should ask you a few questions to see if you are thinking clearly  An example would be to ask your name or your address  Prevent another head injury:   · Wear a helmet that fits properly  Do this when you play sports, or ride a bike, scooter, or skateboard  Helmets help decrease your risk of a serious head injury  Talk to your healthcare provider about other ways you can protect yourself if you play sports  · Wear your seat belt every time you are in a car  This helps to decrease your risk for a head injury if you are in a car accident  Follow up with your healthcare provider as directed:  Write down your questions so you remember to ask them during your visits  © 2017 [employer] Information is for End User's use only and may not be sold, redistributed or otherwise used for commercial purposes  All illustrations and images included in CareNotes® are the copyrighted property of [employer]  or [employer]  The above information is an  only  It is not intended as medical advice for individual conditions or treatments   Talk to your doctor, nurse or pharmacist before following any medical regimen to see if it is safe and effective for you

## 2018-06-08 NOTE — ED PROVIDER NOTES
History  Chief Complaint   Patient presents with    Fall     Pt presents from dementia unit at [facility] for a fall  Per staff pt was reaching down to pick something up from chair and fell over  pt offers no complaints of pain  Obviously lump to left forehead  -thinners, -LOC     27-year-old female with history of dementia presents from skilled nursing facility for evaluation status post reported mechanical fall after falling over from bending forward in a chair  Apparently occurred just prior to arrival   Patient offers no complaints at this time however is a poor historian due to dementia  There is no obvious bump on her left forehead  There is no bleeding  Patient is moving all extremities spontaneously and does not take blood thinners  Unable to obtain history from patient at this time due to advanced dementia  History obtained from chart and from nursing notes  Impression:  Closed head injury with forehead hematoma  Plan:  CT head and C-spine if normal will discharge back to skilled nursing facility  Prior to Admission Medications   Prescriptions Last Dose Informant Patient Reported? Taking? Polyvinyl Alcohol (LUBRICANT DROPS OP)   Yes No   Sig: Apply 1 drop to eye 3 (three) times a day as needed     acetaminophen (TYLENOL) 325 mg tablet   Yes No   Sig: Take 650 mg by mouth every 6 (six) hours as needed for mild pain   albuterol (2 5 mg/3 mL) 0 083 % nebulizer solution   No No   Sig: Take 3 mL by nebulization every 6 (six) hours as needed for shortness of breath   aspirin 81 mg chewable tablet   Yes No   Sig: Chew 81 mg daily     busPIRone (BUSPAR) 7 5 mg tablet   Yes No   Sig: Take 7 5 mg by mouth 2 (two) times a day   furosemide (LASIX) 20 mg tablet   Yes No   Sig: Take 20 mg by mouth daily     furosemide (LASIX) 40 mg tablet   Yes No   Sig: Take 40 mg by mouth daily before dinner     metoprolol tartrate (LOPRESSOR) 25 mg tablet   No No   Sig: Take 0 5 tablets by mouth every 12 (twelve) hours   sertraline (ZOLOFT) 50 mg tablet   Yes No   Sig: Take 50 mg by mouth daily      Facility-Administered Medications: None       Past Medical History:   Diagnosis Date    CHF (congestive heart failure) (HCC)     Dementia     Diverticulitis     Fx ankle     Fx wrist     Hernia     Hypertension     Moderate intellectual disabilities     Osteoarthritis     Osteoarthritis        Past Surgical History:   Procedure Laterality Date    ABDOMINAL SURGERY         History reviewed  No pertinent family history  I have reviewed and agree with the history as documented  Social History   Substance Use Topics    Smoking status: Never Smoker    Smokeless tobacco: Never Used    Alcohol use No        Review of Systems   Unable to perform ROS: Dementia   All other systems reviewed and are negative  Physical Exam  Physical Exam   Constitutional: She appears well-developed  Appears cachectic with evidence of malnutrition including temporal wasting and wasting of muscle tissue over the deltoid  HENT:   Right Ear: External ear normal    Left Ear: External ear normal    Tongue appears mildly dried lips are mildly chapped  There is a 2 x 2 cm soft tissue swelling over the left forehead  Eyes: Conjunctivae and EOM are normal  Pupils are equal, round, and reactive to light  Neck:   C-collar present  Cardiovascular: Normal rate, normal heart sounds and intact distal pulses  Pulmonary/Chest: Effort normal and breath sounds normal  No respiratory distress  She has no wheezes  Abdominal: Soft  Bowel sounds are normal    Musculoskeletal: She exhibits no edema, tenderness or deformity  Spontaneously moving all 4 extremities  No apparent tenderness no deformity noted  Neurological: She is alert  Disoriented to name, time, event  Skin: Skin is warm and dry  Capillary refill takes less than 2 seconds  Nursing note and vitals reviewed        Vital Signs  ED Triage Vitals   Temperature Pulse Respirations Blood Pressure SpO2   06/08/18 1610 06/08/18 1607 06/08/18 1607 06/08/18 1607 06/08/18 1607   98 °F (36 7 °C) 69 18 118/59 96 %      Temp Source Heart Rate Source Patient Position - Orthostatic VS BP Location FiO2 (%)   06/08/18 1610 06/08/18 1607 06/08/18 1607 06/08/18 1607 --   Oral Monitor Sitting Right arm       Pain Score       06/08/18 1607       No Pain           Vitals:    06/08/18 1607   BP: 118/59   Pulse: 69   Patient Position - Orthostatic VS: Sitting       Visual Acuity      ED Medications  Medications - No data to display    Diagnostic Studies  Results Reviewed     None                 CT spine cervical without contrast   Final Result by Gael Keys MD (06/08 1714)      No cervical spine fracture or traumatic malalignment  Workstation performed: [id number]         CT head without contrast   Final Result by Gael Keys MD (06/08 1707)      No acute intracranial abnormality  Mild to moderate chronic small vessel ischemic changes and volume loss  Mild left frontal scalp soft tissue swelling  Workstation performed: [id number]                    Procedures  Procedures       Phone Contacts  ED Phone Contact    ED Course                               [facility]  CritCare Time    Disposition  Final diagnoses:   None     ED Disposition     None      Follow-up Information    None         Patient's Medications   Discharge Prescriptions    No medications on file     No discharge procedures on file      ED Provider  Electronically Signed by           Bruce Araiza DO  06/08/18 0607

## 2018-06-20 ENCOUNTER — APPOINTMENT (EMERGENCY)
Dept: CT IMAGING | Facility: HOSPITAL | Age: 83
DRG: 392 | End: 2018-06-20
Payer: COMMERCIAL

## 2018-06-20 ENCOUNTER — HOSPITAL ENCOUNTER (INPATIENT)
Facility: HOSPITAL | Age: 83
LOS: 2 days | Discharge: HOSPICE | DRG: 392 | End: 2018-06-22
Attending: EMERGENCY MEDICINE | Admitting: HOSPITALIST
Payer: COMMERCIAL

## 2018-06-20 DIAGNOSIS — K52.9 COLITIS: ICD-10-CM

## 2018-06-20 DIAGNOSIS — K59.00 CONSTIPATION: Primary | ICD-10-CM

## 2018-06-20 PROBLEM — E87.6 HYPOKALEMIA: Status: ACTIVE | Noted: 2018-06-20

## 2018-06-20 LAB
ALBUMIN SERPL BCP-MCNC: 3.7 G/DL (ref 3.5–5)
ALP SERPL-CCNC: 68 U/L (ref 46–116)
ALT SERPL W P-5'-P-CCNC: 26 U/L (ref 12–78)
ANION GAP SERPL CALCULATED.3IONS-SCNC: 8 MMOL/L (ref 4–13)
AST SERPL W P-5'-P-CCNC: 32 U/L (ref 5–45)
BASOPHILS # BLD AUTO: 0.05 THOUSANDS/ΜL (ref 0–0.1)
BASOPHILS NFR BLD AUTO: 1 % (ref 0–1)
BILIRUB SERPL-MCNC: 0.8 MG/DL (ref 0.2–1)
BUN SERPL-MCNC: 29 MG/DL (ref 5–25)
CALCIUM SERPL-MCNC: 8.9 MG/DL (ref 8.3–10.1)
CHLORIDE SERPL-SCNC: 102 MMOL/L (ref 100–108)
CO2 SERPL-SCNC: 34 MMOL/L (ref 21–32)
CREAT SERPL-MCNC: 1.03 MG/DL (ref 0.6–1.3)
EOSINOPHIL # BLD AUTO: 0.12 THOUSAND/ΜL (ref 0–0.61)
EOSINOPHIL NFR BLD AUTO: 2 % (ref 0–6)
ERYTHROCYTE [DISTWIDTH] IN BLOOD BY AUTOMATED COUNT: 14.4 % (ref 11.6–15.1)
GFR SERPL CREATININE-BSD FRML MDRD: 48 ML/MIN/1.73SQ M
GLUCOSE SERPL-MCNC: 101 MG/DL (ref 65–140)
HCT VFR BLD AUTO: 41.6 % (ref 34.8–46.1)
HGB BLD-MCNC: 13.4 G/DL (ref 11.5–15.4)
LIPASE SERPL-CCNC: 142 U/L (ref 73–393)
LYMPHOCYTES # BLD AUTO: 1.19 THOUSANDS/ΜL (ref 0.6–4.47)
LYMPHOCYTES NFR BLD AUTO: 15 % (ref 14–44)
MCH RBC QN AUTO: 30.9 PG (ref 26.8–34.3)
MCHC RBC AUTO-ENTMCNC: 32.2 G/DL (ref 31.4–37.4)
MCV RBC AUTO: 96 FL (ref 82–98)
MONOCYTES # BLD AUTO: 0.77 THOUSAND/ΜL (ref 0.17–1.22)
MONOCYTES NFR BLD AUTO: 10 % (ref 4–12)
NEUTROPHILS # BLD AUTO: 5.85 THOUSANDS/ΜL (ref 1.85–7.62)
NEUTS SEG NFR BLD AUTO: 73 % (ref 43–75)
PLATELET # BLD AUTO: 127 THOUSANDS/UL (ref 149–390)
PMV BLD AUTO: 12.7 FL (ref 8.9–12.7)
POTASSIUM SERPL-SCNC: 3 MMOL/L (ref 3.5–5.3)
PROT SERPL-MCNC: 6.9 G/DL (ref 6.4–8.2)
RBC # BLD AUTO: 4.33 MILLION/UL (ref 3.81–5.12)
SODIUM SERPL-SCNC: 144 MMOL/L (ref 136–145)
WBC # BLD AUTO: 7.98 THOUSAND/UL (ref 4.31–10.16)

## 2018-06-20 PROCEDURE — 83690 ASSAY OF LIPASE: CPT | Performed by: EMERGENCY MEDICINE

## 2018-06-20 PROCEDURE — 85025 COMPLETE CBC W/AUTO DIFF WBC: CPT | Performed by: EMERGENCY MEDICINE

## 2018-06-20 PROCEDURE — 99223 1ST HOSP IP/OBS HIGH 75: CPT | Performed by: PHYSICIAN ASSISTANT

## 2018-06-20 PROCEDURE — 96360 HYDRATION IV INFUSION INIT: CPT

## 2018-06-20 PROCEDURE — 74177 CT ABD & PELVIS W/CONTRAST: CPT

## 2018-06-20 PROCEDURE — 96361 HYDRATE IV INFUSION ADD-ON: CPT

## 2018-06-20 PROCEDURE — 36415 COLL VENOUS BLD VENIPUNCTURE: CPT | Performed by: EMERGENCY MEDICINE

## 2018-06-20 PROCEDURE — 80053 COMPREHEN METABOLIC PANEL: CPT | Performed by: EMERGENCY MEDICINE

## 2018-06-20 RX ORDER — MAGNESIUM CARB/ALUMINUM HYDROX 105-160MG
148 TABLET,CHEWABLE ORAL ONCE
Status: COMPLETED | OUTPATIENT
Start: 2018-06-20 | End: 2018-06-20

## 2018-06-20 RX ADMIN — SODIUM CHLORIDE 500 ML: 0.9 INJECTION, SOLUTION INTRAVENOUS at 20:31

## 2018-06-20 RX ADMIN — IOHEXOL 85 ML: 350 INJECTION, SOLUTION INTRAVENOUS at 21:33

## 2018-06-20 RX ADMIN — MAGESIUM CITRATE 148 ML: 1.75 LIQUID ORAL at 22:55

## 2018-06-21 LAB
ANION GAP SERPL CALCULATED.3IONS-SCNC: 5 MMOL/L (ref 4–13)
BASOPHILS # BLD AUTO: 0.04 THOUSANDS/ΜL (ref 0–0.1)
BASOPHILS NFR BLD AUTO: 1 % (ref 0–1)
BUN SERPL-MCNC: 22 MG/DL (ref 5–25)
CALCIUM SERPL-MCNC: 8.3 MG/DL (ref 8.3–10.1)
CHLORIDE SERPL-SCNC: 103 MMOL/L (ref 100–108)
CO2 SERPL-SCNC: 29 MMOL/L (ref 21–32)
CREAT SERPL-MCNC: 0.77 MG/DL (ref 0.6–1.3)
EOSINOPHIL # BLD AUTO: 0.2 THOUSAND/ΜL (ref 0–0.61)
EOSINOPHIL NFR BLD AUTO: 3 % (ref 0–6)
ERYTHROCYTE [DISTWIDTH] IN BLOOD BY AUTOMATED COUNT: 14.2 % (ref 11.6–15.1)
GFR SERPL CREATININE-BSD FRML MDRD: 68 ML/MIN/1.73SQ M
GLUCOSE SERPL-MCNC: 88 MG/DL (ref 65–140)
HCT VFR BLD AUTO: 40.7 % (ref 34.8–46.1)
HGB BLD-MCNC: 12.8 G/DL (ref 11.5–15.4)
LYMPHOCYTES # BLD AUTO: 1.07 THOUSANDS/ΜL (ref 0.6–4.47)
LYMPHOCYTES NFR BLD AUTO: 18 % (ref 14–44)
MCH RBC QN AUTO: 30.4 PG (ref 26.8–34.3)
MCHC RBC AUTO-ENTMCNC: 31.4 G/DL (ref 31.4–37.4)
MCV RBC AUTO: 97 FL (ref 82–98)
MONOCYTES # BLD AUTO: 0.64 THOUSAND/ΜL (ref 0.17–1.22)
MONOCYTES NFR BLD AUTO: 11 % (ref 4–12)
NEUTROPHILS # BLD AUTO: 4.05 THOUSANDS/ΜL (ref 1.85–7.62)
NEUTS SEG NFR BLD AUTO: 68 % (ref 43–75)
PLATELET # BLD AUTO: 119 THOUSANDS/UL (ref 149–390)
PLATELET # BLD AUTO: 120 THOUSANDS/UL (ref 149–390)
PMV BLD AUTO: 12.9 FL (ref 8.9–12.7)
PMV BLD AUTO: 13 FL (ref 8.9–12.7)
POTASSIUM SERPL-SCNC: 2.9 MMOL/L (ref 3.5–5.3)
RBC # BLD AUTO: 4.21 MILLION/UL (ref 3.81–5.12)
SODIUM SERPL-SCNC: 137 MMOL/L (ref 136–145)
WBC # BLD AUTO: 6 THOUSAND/UL (ref 4.31–10.16)

## 2018-06-21 PROCEDURE — 99285 EMERGENCY DEPT VISIT HI MDM: CPT

## 2018-06-21 PROCEDURE — 80048 BASIC METABOLIC PNL TOTAL CA: CPT | Performed by: PHYSICIAN ASSISTANT

## 2018-06-21 PROCEDURE — 99232 SBSQ HOSP IP/OBS MODERATE 35: CPT | Performed by: HOSPITALIST

## 2018-06-21 PROCEDURE — 85049 AUTOMATED PLATELET COUNT: CPT | Performed by: PHYSICIAN ASSISTANT

## 2018-06-21 PROCEDURE — 87147 CULTURE TYPE IMMUNOLOGIC: CPT | Performed by: PHYSICIAN ASSISTANT

## 2018-06-21 PROCEDURE — 85025 COMPLETE CBC W/AUTO DIFF WBC: CPT | Performed by: PHYSICIAN ASSISTANT

## 2018-06-21 PROCEDURE — 87081 CULTURE SCREEN ONLY: CPT | Performed by: PHYSICIAN ASSISTANT

## 2018-06-21 RX ORDER — ASPIRIN 81 MG/1
81 TABLET, CHEWABLE ORAL DAILY
Status: DISCONTINUED | OUTPATIENT
Start: 2018-06-21 | End: 2018-06-22 | Stop reason: HOSPADM

## 2018-06-21 RX ORDER — POTASSIUM CHLORIDE 14.9 MG/ML
20 INJECTION INTRAVENOUS
Status: DISPENSED | OUTPATIENT
Start: 2018-06-21 | End: 2018-06-21

## 2018-06-21 RX ORDER — POTASSIUM CHLORIDE 20MEQ/15ML
40 LIQUID (ML) ORAL EVERY 4 HOURS
Status: DISCONTINUED | OUTPATIENT
Start: 2018-06-21 | End: 2018-06-21

## 2018-06-21 RX ORDER — DOCUSATE SODIUM 100 MG/1
100 CAPSULE, LIQUID FILLED ORAL 2 TIMES DAILY
Status: DISCONTINUED | OUTPATIENT
Start: 2018-06-21 | End: 2018-06-22 | Stop reason: HOSPADM

## 2018-06-21 RX ORDER — POLYVINYL ALCOHOL 14 MG/ML
2 SOLUTION/ DROPS OPHTHALMIC
Status: DISCONTINUED | OUTPATIENT
Start: 2018-06-21 | End: 2018-06-22 | Stop reason: HOSPADM

## 2018-06-21 RX ORDER — ACETAMINOPHEN 325 MG/1
650 TABLET ORAL EVERY 6 HOURS PRN
Status: DISCONTINUED | OUTPATIENT
Start: 2018-06-21 | End: 2018-06-22 | Stop reason: HOSPADM

## 2018-06-21 RX ORDER — HEPARIN SODIUM 5000 [USP'U]/ML
5000 INJECTION, SOLUTION INTRAVENOUS; SUBCUTANEOUS EVERY 8 HOURS SCHEDULED
Status: DISCONTINUED | OUTPATIENT
Start: 2018-06-21 | End: 2018-06-22 | Stop reason: HOSPADM

## 2018-06-21 RX ORDER — BUSPIRONE HYDROCHLORIDE 5 MG/1
7.5 TABLET ORAL 2 TIMES DAILY
Status: DISCONTINUED | OUTPATIENT
Start: 2018-06-21 | End: 2018-06-22 | Stop reason: HOSPADM

## 2018-06-21 RX ORDER — SODIUM CHLORIDE 9 MG/ML
50 INJECTION, SOLUTION INTRAVENOUS CONTINUOUS
Status: DISCONTINUED | OUTPATIENT
Start: 2018-06-21 | End: 2018-06-22 | Stop reason: HOSPADM

## 2018-06-21 RX ORDER — FUROSEMIDE 20 MG/1
20 TABLET ORAL DAILY
Status: DISCONTINUED | OUTPATIENT
Start: 2018-06-21 | End: 2018-06-22 | Stop reason: HOSPADM

## 2018-06-21 RX ORDER — FUROSEMIDE 40 MG/1
40 TABLET ORAL
Status: DISCONTINUED | OUTPATIENT
Start: 2018-06-21 | End: 2018-06-22 | Stop reason: HOSPADM

## 2018-06-21 RX ORDER — POLYETHYLENE GLYCOL 3350 17 G/17G
17 POWDER, FOR SOLUTION ORAL DAILY PRN
Status: DISCONTINUED | OUTPATIENT
Start: 2018-06-21 | End: 2018-06-22 | Stop reason: HOSPADM

## 2018-06-21 RX ORDER — ONDANSETRON 2 MG/ML
4 INJECTION INTRAMUSCULAR; INTRAVENOUS EVERY 6 HOURS PRN
Status: DISCONTINUED | OUTPATIENT
Start: 2018-06-21 | End: 2018-06-22 | Stop reason: HOSPADM

## 2018-06-21 RX ORDER — ALBUTEROL SULFATE 2.5 MG/3ML
2.5 SOLUTION RESPIRATORY (INHALATION) EVERY 6 HOURS PRN
Status: DISCONTINUED | OUTPATIENT
Start: 2018-06-21 | End: 2018-06-22 | Stop reason: HOSPADM

## 2018-06-21 RX ADMIN — HEPARIN SODIUM 5000 UNITS: 5000 INJECTION, SOLUTION INTRAVENOUS; SUBCUTANEOUS at 05:18

## 2018-06-21 RX ADMIN — ASPIRIN 81 MG: 81 TABLET, CHEWABLE ORAL at 08:11

## 2018-06-21 RX ADMIN — HEPARIN SODIUM 5000 UNITS: 5000 INJECTION, SOLUTION INTRAVENOUS; SUBCUTANEOUS at 14:06

## 2018-06-21 RX ADMIN — BUSPIRONE HYDROCHLORIDE 7.5 MG: 5 TABLET ORAL at 08:11

## 2018-06-21 RX ADMIN — POTASSIUM CHLORIDE 20 MEQ: 200 INJECTION, SOLUTION INTRAVENOUS at 15:35

## 2018-06-21 RX ADMIN — SODIUM CHLORIDE 50 ML/HR: 0.9 INJECTION, SOLUTION INTRAVENOUS at 11:56

## 2018-06-21 RX ADMIN — POTASSIUM CHLORIDE 20 MEQ: 200 INJECTION, SOLUTION INTRAVENOUS at 11:56

## 2018-06-21 NOTE — PROGRESS NOTES
Progress Note - Traci Dial 6/19/1927, 80 y o  female MRN: [id number]    Unit/Bed#: -01 Encounter: [id number]    Primary Care Provider: Willie Crawford DO   Date and time admitted to hospital: 6/20/2018  7:57 PM    * Constipation   Assessment & Plan    · Presents with diarrhea x2 days (likely overflow)  Recently on Bactrim and Keflex for a UTI  · CT Ab/Pelvis- Moderate to large amount of stool noted within the rectum  Mild perirectal edema is noted which can be seen in stercoral colitis  No free air or free fluid  Cholelithiasis  · Afebrile without leukocytosis- unlikely infectious diarrhea  · Start Colace, Dulcolax, Miralax  · Enemas PRN  Chronic diastolic heart failure (HCC)   Assessment & Plan    · Stable  · Daily weights, I&Os  · Fluid restriction  · Continue Lasix  Essential hypertension   Assessment & Plan    · /66  · Continue Lasix, Metoprolol  Hypokalemia   Assessment & Plan    · K 3 0   · Replete and monitor with BMP daily         Dementia   Assessment & Plan    · Continue home medications  VTE Pharmacologic Prophylaxis:   Pharmacologic: Heparin  Mechanical VTE Prophylaxis in Place: Yes    Patient Centered Rounds: I have performed bedside rounds with nursing staff today  Discussions with Specialists or Other Care Team Provider: none    Education and Discussions with Family / Patient: patient and family     Time Spent for Care: 30 minutes  More than 50% of total time spent on counseling and coordination of care as described above      Current Length of Stay: 1 day(s)    Current Patient Status: Inpatient   Certification Statement: The patient will continue to require additional inpatient hospital stay due to constipation with possible stercoral colitis; hypokalemia with poor oral intake      Discharge Plan / Estimated Discharge Date: TBD based on clinical course    Code Status: Level 3 - DNAR and DNI    Subjective:   UTO due to mental status/dementia     Objective:     Vitals:   Temp (24hrs), Av 3 °F (36 8 °C), Min:98 1 °F (36 7 °C), Max:98 5 °F (36 9 °C)    HR:  [71-81] 71  Resp:  [16] 16  BP: ()/(51-69) 98/57  SpO2:  [94 %-95 %] 94 %  Body mass index is 17 5 kg/m²  Input and Output Summary (last 24 hours): Intake/Output Summary (Last 24 hours) at 18 1154  Last data filed at 18 2255   Gross per 24 hour   Intake              500 ml   Output                0 ml   Net              500 ml       Physical Exam:     Physical Exam   Constitutional: No distress  Frail appearing    HENT:   Head: Normocephalic and atraumatic  Cardiovascular: Normal rate and regular rhythm  Pulmonary/Chest: Effort normal and breath sounds normal  No respiratory distress  She has no wheezes  Abdominal: Soft  She exhibits no distension  There is no tenderness  There is no rebound  Hyperactive    Neurological:   Somnolent; will moan to voice, but will not open eyes at this time  Skin: Skin is warm and dry  She is not diaphoretic  Nursing note and vitals reviewed  Additional Data:     Labs:      Results from last 7 days  Lab Units 18  0530   WBC Thousand/uL 6 00   HEMOGLOBIN g/dL 12 8   HEMATOCRIT % 40 7   PLATELETS Thousands/uL 120*  119*   NEUTROS PCT % 68   LYMPHS PCT % 18   MONOS PCT % 11   EOS PCT % 3       Results from last 7 days  Lab Units 18  0530 18  2032   SODIUM mmol/L 137 144   POTASSIUM mmol/L 2 9* 3 0*   CHLORIDE mmol/L 103 102   CO2 mmol/L 29 34*   BUN mg/dL 22 29*   CREATININE mg/dL 0 77 1 03   CALCIUM mg/dL 8 3 8 9   TOTAL PROTEIN g/dL  --  6 9   BILIRUBIN TOTAL mg/dL  --  0 80   ALK PHOS U/L  --  68   ALT U/L  --  26   AST U/L  --  32   GLUCOSE RANDOM mg/dL 88 101           * I Have Reviewed All Lab Data Listed Above  * Additional Pertinent Lab Tests Reviewed:  All Labs Within Last 24 Hours Reviewed    Imaging:    Imaging Reports Reviewed Today Include:   Imaging Personally Reviewed by Myself Includes:      Recent Cultures (last 7 days):           Last 24 Hours Medication List:     Current Facility-Administered Medications:  acetaminophen 650 mg Oral Q6H PRN Lella Kane, PA-C   albuterol 2 5 mg Nebulization Q6H PRN Lella Kane, PA-C   aspirin 81 mg Oral Daily Lella Kane, PA-C   bisacodyl 5 mg Oral Daily PRN Lella Kane, PA-C   busPIRone 7 5 mg Oral BID Lella Kane, PA-C   docusate sodium 100 mg Oral BID Lella Kane, PA-C   furosemide 20 mg Oral Daily Lella Kane, PA-C   furosemide 40 mg Oral Before Dinner Lella Kane, PA-C   heparin (porcine) 5,000 Units Subcutaneous [facility] Lella Kane, PA-C   metoprolol tartrate 12 5 mg Oral Q12H [facility] Lella Kane, PA-C   ondansetron 4 mg Intravenous Q6H PRN Lella Kane, PA-C   polyethylene glycol 17 g Oral Daily PRN Lella Kane, PA-C   polyvinyl alcohol 2 drop Both Eyes Q3H PRN Lella Kane, PA-C   potassium chloride 20 mEq Intravenous Arjun Celis MD   sertraline 50 mg Oral Daily Lella Kane, PA-C   sodium chloride 50 mL/hr Intravenous Continuous William Villa MD        Today, Patient Was Seen By: William Villa MD    ** Please Note: This note has been constructed using a voice recognition system   **

## 2018-06-21 NOTE — ASSESSMENT & PLAN NOTE
· Presents with diarrhea x2 days (likely overflow)  Recently on Bactrim and Keflex for a UTI  · CT Ab/Pelvis- Moderate to large amount of stool noted within the rectum  Mild perirectal edema is noted which can be seen in stercoral colitis  No free air or free fluid  Cholelithiasis  · Afebrile without leukocytosis- unlikely infectious diarrhea  · Start Colace, Dulcolax, Miralax  · Enemas PRN

## 2018-06-21 NOTE — H&P
H&P- Opal Garrett 6/19/1927, 80 y o  female MRN: [id number]    Unit/Bed#: ED 24 Encounter: [id number]    Primary Care Provider: Maribel Coleman DO   Date and time admitted to hospital: 6/20/2018  7:57 PM    * Constipation   Assessment & Plan    · Presents with diarrhea x2 days  Recently on Bactrim and Keflex for a UTI  · CT Ab/Pelvis- Moderate to large amount of stool noted within the rectum  Mild perirectal edema is noted which can be seen in stercoral colitis  No free air or free fluid  Cholelithiasis  · Afebrile without leukocytosis- unlikely infectious diarrhea  · Start Colace, Dulcolax, Miralax  · Enemas PRN  · Consider GI consult  Hypokalemia   Assessment & Plan    · K 3 0   · Replete and monitor  Chronic diastolic heart failure (HCC)   Assessment & Plan    · Stable  · Daily weights, I&Os  · Fluid restriction  · Continue Lasix  Essential hypertension   Assessment & Plan    · /66  · Continue Lasix, Metoprolol  Dementia   Assessment & Plan    · Continue home medications  VTE Prophylaxis: Heparin  / sequential compression device   Code Status: DNR/DNI- Discussed with daughter at bedside  POLST: POLST form is not discussed and not completed at this time  Discussion with family: Discussed with family at bedside  Anticipated Length of Stay:  Patient will be admitted on an Inpatient basis with an anticipated length of stay of  Greater than 2 midnights  Justification for Hospital Stay: Constipation  Total Time for Visit, including Counseling / Coordination of Care: 45 minutes  Greater than 50% of this total time spent on direct patient counseling and coordination of care  Chief Complaint:   Diarrhea  History of Present Illness:    Opal Garrett is a 80 y o  female, PMHx of CHF, HTN, Dementia,  who presents with diarrhea x2 days  History is provided by daughter at bedside, as patient has dementia   Daughter reports that for the past 2 days the patient has had watery stools and has complained of abdominal pain  No reports of nausea, vomiting, fever  She was treated for an E coli UTI approximately 2 weeks ago with ampicillin and macrobid per daughter  No urinary symptoms since  Daughter states that she has not been eating very much recently, but will drink ensure  Review of Systems:    Review of Systems   Unable to perform ROS: Dementia       Past Medical and Surgical History:     Past Medical History:   Diagnosis Date    CH[facility]congestive heart failure) ([facility] [address] )     Constipation 6/20/2018    Dementia     Diverticulitis     Fx ankle     Fx wrist     Hernia     Hypertension     Hypokalemia 6/20/2018    Moderate intellectual disabilities     Osteoarthritis     Osteoarthritis        Past Surgical History:   Procedure Laterality Date    ABDOMINAL SURGERY         Meds/Allergies:    Prior to Admission medications    Medication Sig Start Date End Date Taking? Authorizing Provider   acetaminophen (TYLENOL) 325 mg tablet Take 650 mg by mouth every 6 (six) hours as needed for mild pain    Historical Provider, MD   albuterol (2 5 mg/3 mL) 0 083 % nebulizer solution Take 3 mL by nebulization every 6 (six) hours as needed for shortness of breath 12/23/17   Kalyan Atkins MD   aspirin 81 mg chewable tablet Chew 81 mg daily      Historical Provider, MD   busPIRone (BUSPAR) 7 5 mg tablet Take 7 5 mg by mouth 2 (two) times a day    Historical Provider, MD   furosemide (LASIX) 20 mg tablet Take 20 mg by mouth daily      Historical Provider, MD   furosemide (LASIX) 40 mg tablet Take 40 mg by mouth daily before dinner      Historical Provider, MD   metoprolol tartrate (LOPRESSOR) 25 mg tablet Take 0 5 tablets by mouth every 12 (twelve) hours 12/23/17   Kalyan Atkins MD   Polyvinyl Alcohol (LUBRICANT DROPS OP) Apply 1 drop to eye 3 (three) times a day as needed      Historical Provider, MD   sertraline (ZOLOFT) 50 mg tablet Take 50 mg by mouth daily    Historical Provider, MD     I have reviewed home medications with patient family member  Allergies: No Known Allergies    Social History:     Marital Status:    Occupation: Unknown  Patient Pre-hospital Living Situation: [facility]  Patient Pre-hospital Level of Mobility: Limited  Wheelchair bound  Patient Pre-hospital Diet Restrictions: None  Substance Use History:   History   Alcohol Use No     History   Smoking Status    Never Smoker   Smokeless Tobacco    Never Used     History   Drug Use No       Family History:    non-contributory    Physical Exam:     Vitals:   Blood Pressure: 109/66 (06/20/18 2015)  Pulse: 80 (06/20/18 2015)  Temperature: 98 5 °F (36 9 °C) (06/20/18 2001)  Temp Source: Oral (06/20/18 2001)  Respirations: 16 (06/20/18 2001)  Weight - Scale: 47 3 kg (104 lb 4 4 oz) (06/20/18 2001)  SpO2: 94 % (06/20/18 2015)    Physical Exam   Constitutional: She is oriented to person, place, and time  She appears well-developed and well-nourished  She is cooperative  She does not appear ill  No distress  HENT:   Head: Normocephalic and atraumatic  Eyes: Conjunctivae, EOM and lids are normal  Pupils are equal, round, and reactive to light  Cardiovascular: Normal rate, regular rhythm, normal heart sounds and normal pulses  No murmur heard  No LE edema bilaterally  Pulmonary/Chest: Effort normal and breath sounds normal  She has no wheezes  She has no rhonchi  She has no rales  Abdominal: Soft  Normal appearance  Bowel sounds are decreased  There is generalized tenderness  There is no rebound and no guarding  Facial grimacing upon palpation  Musculoskeletal: Normal range of motion  Neurological: She is alert and oriented to person, place, and time  She has normal strength  She is not disoriented  No sensory deficit  Skin: Skin is warm, dry and intact  She is not diaphoretic  Psychiatric: She has a normal mood and affect   Her speech is normal and behavior is normal  Cognition and memory are normal    Vitals reviewed  Additional Data:     Lab Results: I have personally reviewed pertinent reports  Results from last 7 days  Lab Units 06/20/18 2032   WBC Thousand/uL 7 98   HEMOGLOBIN g/dL 13 4   HEMATOCRIT % 41 6   PLATELETS Thousands/uL 127*   NEUTROS PCT % 73   LYMPHS PCT % 15   MONOS PCT % 10   EOS PCT % 2       Results from last 7 days  Lab Units 06/20/18 2032   SODIUM mmol/L 144   POTASSIUM mmol/L 3 0*   CHLORIDE mmol/L 102   CO2 mmol/L 34*   BUN mg/dL 29*   CREATININE mg/dL 1 03   CALCIUM mg/dL 8 9   TOTAL PROTEIN g/dL 6 9   BILIRUBIN TOTAL mg/dL 0 80   ALK PHOS U/L 68   ALT U/L 26   AST U/L 32   GLUCOSE RANDOM mg/dL 101                   Imaging: I have personally reviewed pertinent reports  CT abdomen pelvis with contrast   Final Result by Jeffrey Koch MD (06/20 2155)      Moderate to large amount of stool noted within the rectum  Mild perirectal edema is noted which can be seen in stercoral colitis  No free air or free fluid  Cholelithiasis  Workstation performed: [id number]             EKG, Pathology, and Other Studies Reviewed on Admission:   · EKG: Unavailable  Allscripts / Epic Records Reviewed: Yes     ** Please Note: This note has been constructed using a voice recognition system   **

## 2018-06-21 NOTE — ASSESSMENT & PLAN NOTE
· Presents with diarrhea x2 days  Recently on Bactrim and Keflex for a UTI  · CT Ab/Pelvis- Moderate to large amount of stool noted within the rectum  Mild perirectal edema is noted which can be seen in stercoral colitis  No free air or free fluid  Cholelithiasis  · Afebrile without leukocytosis- unlikely infectious diarrhea  · Start Colace, Dulcolax, Miralax  · Enemas PRN  · Consider GI consult

## 2018-06-21 NOTE — SOCIAL WORK
CM spoke with Patricia Antoine, Director of Resident Care, [facility] at [place], Memory unit  Brandon Stoll stated pt's daughters were at [place] and want pt to be on Hospice care  Family selected [facility]   Hospice Liaison will visit pt in hospital  DC plan return to [place] on hospice services

## 2018-06-21 NOTE — ED PROVIDER NOTES
History  Chief Complaint   Patient presents with    Diarrhea     Pt  reoports to ED via ems from [facility], with c/o diarrhea x3 days per SNF (-) blood in stool  pt  Hx of dementia      28-year-old female sent in from nursing home for evaluation of diarrhea  Patient recently had been treated for UTI and has 2 separate antibiotics  Approximately 2 days ago she began to develop developed loose stools  No vomiting no fever  Patient has significant dementia and history comes from her daughter        History provided by:  Relative  History limited by:  Dementia   used: No    Diarrhea   Quality:  Semi-solid  Severity:  Moderate  Onset quality:  Sudden  Duration:  2 hours  Timing:  Constant  Progression:  Worsening  Ineffective treatments:  None tried  Associated symptoms: no abdominal pain, no arthralgias, no fever, no headaches and no vomiting    Risk factors: recent antibiotic use    Risk factors: no suspicious food intake and no travel to endemic areas        Prior to Admission Medications   Prescriptions Last Dose Informant Patient Reported? Taking? Polyvinyl Alcohol (LUBRICANT DROPS OP)   Yes No   Sig: Apply 1 drop to eye 3 (three) times a day as needed     acetaminophen (TYLENOL) 325 mg tablet   Yes No   Sig: Take 650 mg by mouth every 6 (six) hours as needed for mild pain   albuterol (2 5 mg/3 mL) 0 083 % nebulizer solution   No No   Sig: Take 3 mL by nebulization every 6 (six) hours as needed for shortness of breath   aspirin 81 mg chewable tablet   Yes No   Sig: Chew 81 mg daily     busPIRone (BUSPAR) 7 5 mg tablet   Yes No   Sig: Take 7 5 mg by mouth 2 (two) times a day   furosemide (LASIX) 20 mg tablet   Yes No   Sig: Take 20 mg by mouth daily     furosemide (LASIX) 40 mg tablet   Yes No   Sig: Take 40 mg by mouth daily before dinner     metoprolol tartrate (LOPRESSOR) 25 mg tablet   No No   Sig: Take 0 5 tablets by mouth every 12 (twelve) hours   sertraline (ZOLOFT) 50 mg tablet   Yes No   Sig: Take 50 mg by mouth daily      Facility-Administered Medications: None       Past Medical History:   Diagnosis Date    CHF (congestive heart failure) (HCC)     Dementia     Diverticulitis     Fx ankle     Fx wrist     Hernia     Hypertension     Moderate intellectual disabilities     Osteoarthritis     Osteoarthritis        Past Surgical History:   Procedure Laterality Date    ABDOMINAL SURGERY         History reviewed  No pertinent family history  I have reviewed and agree with the history as documented  Social History   Substance Use Topics    Smoking status: Never Smoker    Smokeless tobacco: Never Used    Alcohol use No        Review of Systems   Constitutional: Negative for fatigue and fever  HENT: Negative for congestion and ear pain  Eyes: Negative for discharge and redness  Respiratory: Negative for apnea, cough, shortness of breath and wheezing  Cardiovascular: Negative for chest pain  Gastrointestinal: Positive for diarrhea  Negative for abdominal pain and vomiting  Endocrine: Negative for cold intolerance and polydipsia  Genitourinary: Negative for difficulty urinating and hematuria  Musculoskeletal: Negative for arthralgias and back pain  Skin: Negative for color change and rash  Allergic/Immunologic: Negative for environmental allergies and immunocompromised state  Neurological: Negative for numbness and headaches  Hematological: Negative for adenopathy  Does not bruise/bleed easily  Psychiatric/Behavioral: Negative for agitation and behavioral problems  Physical Exam  Physical Exam   Constitutional: Vital signs are normal  She appears well-developed and well-nourished  Non-toxic appearance  She appears distressed  HENT:   Head: Normocephalic and atraumatic     Right Ear: Tympanic membrane and external ear normal    Left Ear: Tympanic membrane and external ear normal    Nose: Nose normal  No rhinorrhea, sinus tenderness or nasal deformity  Mouth/Throat: Uvula is midline and oropharynx is clear and moist  Normal dentition  Eyes: Conjunctivae, EOM and lids are normal  Pupils are equal, round, and reactive to light  Right eye exhibits no discharge  Left eye exhibits no discharge  Neck: Trachea normal and normal range of motion  Neck supple  No JVD present  Carotid bruit is not present  Cardiovascular: Normal rate, regular rhythm, intact distal pulses and normal pulses  No extrasystoles are present  PMI is not displaced  Pulmonary/Chest: Effort normal and breath sounds normal  No accessory muscle usage  No respiratory distress  She has no wheezes  She has no rhonchi  She has no rales  Abdominal: Soft  Normal appearance and bowel sounds are normal  She exhibits no mass  There is generalized tenderness ( mild)  There is no rigidity, no rebound and no guarding  Musculoskeletal:        Right shoulder: She exhibits normal range of motion, no bony tenderness, no swelling and no deformity  Cervical back: Normal  She exhibits normal range of motion, no tenderness, no bony tenderness and no deformity  Lymphadenopathy:     She has no cervical adenopathy  She has no axillary adenopathy  Neurological: She is alert  She has normal strength and normal reflexes  She is disoriented  No cranial nerve deficit or sensory deficit  GCS eye subscore is 4  GCS verbal subscore is 5  GCS motor subscore is 6  Skin: Skin is warm and dry  No rash noted  Psychiatric: Her mood appears anxious  Cognition and memory are impaired  Nursing note and vitals reviewed        Vital Signs  ED Triage Vitals [06/20/18 2001]   Temperature Pulse Respirations Blood Pressure SpO2   98 5 °F (36 9 °C) 81 16 107/69 94 %      Temp Source Heart Rate Source Patient Position - Orthostatic VS BP Location FiO2 (%)   Oral Monitor Lying Right arm --      Pain Score       No Pain           Vitals:    06/20/18 2001 06/20/18 2015   BP: 107/69 109/66   Pulse: 81 80   Patient Position - Orthostatic VS: Lying        Visual Acuity  Visual Acuity      Most Recent Value   L Pupil Size (mm)  4   R Pupil Size (mm)  3          ED Medications  Medications   magnesium citrate (CITROMA) oral solution 148 mL (not administered)   sodium chloride 0 9 % bolus 500 mL (500 mL Intravenous New Bag 6/20/18 2031)   iohexol (OMNIPAQUE) 350 MG/ML injection (MULTI-DOSE) 85 mL (85 mL Intravenous Given 6/20/18 2133)       Diagnostic Studies  Results Reviewed     Procedure Component Value Units Date/Time    Comprehensive metabolic panel [[id number]]  (Abnormal) Collected:  06/20/18 2032    Lab Status:  Final result Specimen:  Blood from Arm, Right Updated:  06/20/18 2100     Sodium 144 mmol/L      Potassium 3 0 (L) mmol/L      Chloride 102 mmol/L      CO2 34 (H) mmol/L      Anion Gap 8 mmol/L      BUN 29 (H) mg/dL      Creatinine 1 03 mg/dL      Glucose 101 mg/dL      Calcium 8 9 mg/dL      AST 32 U/L      ALT 26 U/L      Alkaline Phosphatase 68 U/L      Total Protein 6 9 g/dL      Albumin 3 7 g/dL      Total Bilirubin 0 80 mg/dL      eGFR 48 ml/min/1 73sq m     Narrative:         National Kidney Disease Education Program recommendations are as follows:  GFR calculation is accurate only with a steady state creatinine  Chronic Kidney disease less than 60 ml/min/1 73 sq  meters  Kidney failure less than 15 ml/min/1 73 sq  meters      Lipase [[id number]]  (Normal) Collected:  06/20/18 2032    Lab Status:  Final result Specimen:  Blood from Arm, Right Updated:  06/20/18 2100     Lipase 142 u/L     CBC and differential [[id number]]  (Abnormal) Collected:  06/20/18 2032    Lab Status:  Final result Specimen:  Blood from Arm, Right Updated:  06/20/18 2040     WBC 7 98 Thousand/uL      RBC 4 33 Million/uL      Hemoglobin 13 4 g/dL      Hematocrit 41 6 %      MCV 96 fL      MCH 30 9 pg      MCHC 32 2 g/dL      RDW 14 4 %      MPV 12 7 fL      Platelets 821 (L) Thousands/uL      Neutrophils Relative 73 % Lymphocytes Relative 15 %      Monocytes Relative 10 %      Eosinophils Relative 2 %      Basophils Relative 1 %      Neutrophils Absolute 5 85 Thousands/µL      Lymphocytes Absolute 1 19 Thousands/µL      Monocytes Absolute 0 77 Thousand/µL      Eosinophils Absolute 0 12 Thousand/µL      Basophils Absolute 0 05 Thousands/µL     UA w Reflex to Microscopic w Reflex to Culture [[id number]]     Lab Status:  No result Specimen:  Urine     Clostridium difficile toxin by PCR [[id number]]     Lab Status:  No result Specimen:  Stool from Per Rectum                  CT abdomen pelvis with contrast   Final Result by Unique Jeronimo MD (06/20 2155)      Moderate to large amount of stool noted within the rectum  Mild perirectal edema is noted which can be seen in stercoral colitis  No free air or free fluid  Cholelithiasis        Workstation performed: [id number]                    Procedures  Procedures       Phone Contacts  ED Phone Contact    ED Course                               MDM  Number of Diagnoses or Management Options  Colitis: new and requires workup  Constipation: new and requires workup     Amount and/or Complexity of Data Reviewed  Clinical lab tests: ordered and reviewed  Tests in the radiology section of CPT®: ordered  Tests in the medicine section of CPT®: ordered and reviewed  Obtain history from someone other than the patient: yes  Review and summarize past medical records: yes  Discuss the patient with other providers: yes    Patient Progress  Patient progress: stable    CritCare Time    Disposition  Final diagnoses:   Constipation   Colitis     Time reflects when diagnosis was documented in both MDM as applicable and the Disposition within this note     Time User Action Codes Description Comment    6/20/2018 10:16 PM Carmen HOWARD Add [K59 00] Constipation     6/20/2018 10:16 PM Lang Sport Add [K52 9] Colitis       ED Disposition     ED Disposition Condition Comment    Admit  Case was discussed with Luz and the patient's admission status was agreed to be Admission Status: inpatient status to the service of Dr Angeles Handy   Follow-up Information    None         Patient's Medications   Discharge Prescriptions    No medications on file     No discharge procedures on file      ED Provider  Electronically Signed by           Josie Tan DO  06/20/18 4099

## 2018-06-21 NOTE — ED NOTES
Spoke with [facility], gave RN update on pt   Made aware of admission status     Yenifer Martinez RN  06/20/18 9430

## 2018-06-21 NOTE — CASE MANAGEMENT
Thank you,  [address] [facility] in the [facility] by Sukumar Tran for 2017  Network Utilization Review Department  Phone: [phone]; Fax [phone]  ATTENTION: The Network Utilization Review Department is now centralized for our 7 Facilities  Make a note that we have a new phone and fax numbers for our Department  Please call with any questions or concerns to [phone] and carefully follow the prompts so that you are directed to the right person  All voicemails are confidential  Fax any determinations, approvals, denials, and requests for initial or continue stay review clinical to [phone]  Due to HIGH CALL volume, it would be easier if you could please send faxed requests to expedite your requests and in part, help us provide discharge notifications faster  Initial Clinical Review    Admission: Date/Time/Statement: INPATIENT ADMISSION 6/20/18 @ 2219     Orders Placed This Encounter   Procedures    Inpatient Admission (expected length of stay for this patient is greater than two midnights)     Standing Status:   Standing     Number of Occurrences:   1     Order Specific Question:   Admitting Physician     Answer:   Paul Dickens [[id number]]     Order Specific Question:   Level of Care     Answer:   Med Surg [16]     Order Specific Question:   Estimated length of stay     Answer:   More than 2 Midnights     Order Specific Question:   Certification     Answer:   I certify that inpatient services are medically necessary for this patient for a duration of greater than two midnights  See H&P and MD Progress Notes for additional information about the patient's course of treatment       ED: Date/Time/Mode of Arrival:   ED Arrival Information     Expected Arrival Acuity Means of Arrival Escorted By Service Admission Type    - 6/20/2018 19:55 Urgent Ambulance [facility] Urgent    Arrival Complaint    DIARRHEA        Chief Complaint:   Chief Complaint   Patient presents with    Diarrhea     Pt  reoports to ED via ems from [facility], with c/o diarrhea x3 days per SNF (-) blood in stool  pt  Hx of dementia        History of Illness: 80 y o  female presenting with diarrhea x2 days  History is provided by daughter at bedside, as patient has dementia  Daughter reports that for the past 2 days the patient has had watery stools and has complained of abdominal pain  No reports of nausea, vomiting, fever  She was treated for an E coli UTI approximately 2 weeks ago with ampicillin and macrobid per daughter  No urinary symptoms since   Daughter states that she has not been eating very much recently, but will drink ensure      ED Vital Signs:   ED Triage Vitals [06/20/18 2001]   Temperature Pulse Respirations Blood Pressure SpO2   98 5 °F (36 9 °C) 81 16 107/69 94 %      Temp Source Heart Rate Source Patient Position - Orthostatic VS BP Location FiO2 (%)   Oral Monitor Lying Right arm --      Pain Score       No Pain        Wt Readings from Last 1 Encounters:   06/21/18 43 4 kg (95 lb 10 9 oz)     Vital Signs (abnormal): 06/21/2018: BP 98/57, 92/51    Abnormal Labs/Diagnostic Test Results: 06/20/2018:   Potassium 3 0     CO2 34     BUN 29     CT abdomen w contrast: Moderate to large amount of stool noted within the rectum   Mild perirectal edema is noted which can be seen in stercoral colitis     06/21/2018: Potassium 2 9, platelet 848      ED Treatment:   Medication Administration from 06/20/2018 1955 to 06/21/2018 0049       Date/Time Order Dose Route Action Action by Comments     06/20/2018 2255 sodium chloride 0 9 % bolus 500 mL 0 mL Intravenous Stopped Fransisca Ng RN      06/20/2018 2031 sodium chloride 0 9 % bolus 500 mL 500 mL Intravenous Regi 37 Fransisca Ng RN      06/20/2018 2133 iohexol (OMNIPAQUE) 350 MG/ML injection (MULTI-DOSE) 85 mL 85 mL Intravenous Given Angel Owen      06/20/2018 2255 magnesium citrate (CITROMA) oral solution 148 mL 148 mL Oral Given Xin Manjarrez RN           Past Medical/Surgical History: Active Ambulatory Problems     Diagnosis Date Noted    Essential hypertension 04/08/2016    Shortness of breath 08/20/2016    Lung nodules 08/21/2016    Chronic diastolic heart failure ([facility] ) 08/21/2016    Dementia 08/21/2016    EKG abnormalities 08/26/2016    Chronic fracture 08/26/2016    Hypertension     CHF (congestive heart failure) ([place] [address] )      Resolved Ambulatory Problems     Diagnosis Date Noted    Chest pain 04/08/2016    Recent bereavement 04/08/2016    Anxiety 04/08/2016    Acute bacterial bronchitis 05/29/2016    Congestive heart failure ([facility] ) 05/29/2016    Fall 08/26/2016    Hyperkalemia 08/26/2016    Thrombocytopenia ([place] [address] ) 08/26/2016    Weight gain 08/26/2016    Contusion of right elbow 08/26/2016    Head contusion 08/26/2016    Contusion of right hip 08/26/2016     Past Medical History:   Diagnosis Date    [facility]) ([facility])     Constipation 6/20/2018    Dementia     Diverticulitis     Fx ankle     Fx wrist     Hernia     Hypertension     Hypokalemia 6/20/2018    Moderate intellectual disabilities     Osteoarthritis     Osteoarthritis        Admitting Diagnosis: Diarrhea [R19 7]  Colitis [K52 9]  Constipation [K59 00]    Age/Sex: 80 y o  female    Assessment/Plan:   Constipation   Assessment & Plan     · Presents with diarrhea x2 days  Recently on Bactrim and Keflex for a UTI  · CT Ab/Pelvis- Moderate to large amount of stool noted within the rectum   Mild perirectal edema is noted which can be seen in stercoral colitis  No free air or free fluid  Cholelithiasis  · Afebrile without leukocytosis- unlikely infectious diarrhea  · Start Colace, Dulcolax, Miralax  · Enemas PRN  · Consider GI consult       Hypokalemia   Assessment & Plan     · K 3 0   · Replete and monitor       Chronic diastolic heart failure (HCC)   Assessment & Plan     · Stable    · Daily weights, I&Os   · Fluid restriction  · Continue Lasix       Essential hypertension   Assessment & Plan     · /66    · Continue Lasix, Metoprolol           Admission Orders:  Scheduled Meds:   Current Facility-Administered Medications:  acetaminophen 650 mg Oral Q6H PRN    albuterol 2 5 mg Nebulization Q6H PRN    aspirin 81 mg Oral Daily    bisacodyl 5 mg Oral Daily PRN    busPIRone 7 5 mg Oral BID    docusate sodium 100 mg Oral BID    furosemide 20 mg Oral Daily    furosemide 40 mg Oral Before Dinner    heparin (porcine) 5,000 Units Subcutaneous Q8H [facility] long-term    metoprolol tartrate 12 5 mg Oral Q12H [facility]    ondansetron 4 mg Intravenous Q6H PRN    polyethylene glycol 17 g Oral Daily PRN    polyvinyl alcohol 2 drop Both Eyes Q3H PRN    potassium chloride 20 mEq Intravenous Q2H Last Rate: 20 mEq (06/21/18 1156)   sertraline 50 mg Oral Daily    sodium chloride 50 mL/hr Intravenous Continuous Last Rate: 50 mL/hr (06/21/18 1156)     Continuous Infusions:   sodium chloride 50 mL/hr Last Rate: 50 mL/hr (06/21/18 1156)     PRN Meds:   acetaminophen    albuterol    bisacodyl    ondansetron    polyethylene glycol    polyvinyl alcohol   Dysphagia diet  Dietary supplements  OOB  Incentive spirometry  Soap suds enema  Vitals routine  I/O  Vitals routine  Heparin SQ & platelet count  SCD

## 2018-06-22 VITALS
HEART RATE: 66 BPM | OXYGEN SATURATION: 91 % | WEIGHT: 96.12 LBS | DIASTOLIC BLOOD PRESSURE: 75 MMHG | SYSTOLIC BLOOD PRESSURE: 132 MMHG | RESPIRATION RATE: 18 BRPM | TEMPERATURE: 97.5 F | BODY MASS INDEX: 17.58 KG/M2

## 2018-06-22 LAB
ANION GAP SERPL CALCULATED.3IONS-SCNC: 6 MMOL/L (ref 4–13)
BUN SERPL-MCNC: 19 MG/DL (ref 5–25)
CALCIUM SERPL-MCNC: 8.9 MG/DL (ref 8.3–10.1)
CHLORIDE SERPL-SCNC: 102 MMOL/L (ref 100–108)
CO2 SERPL-SCNC: 30 MMOL/L (ref 21–32)
CREAT SERPL-MCNC: 0.92 MG/DL (ref 0.6–1.3)
GFR SERPL CREATININE-BSD FRML MDRD: 55 ML/MIN/1.73SQ M
GLUCOSE SERPL-MCNC: 191 MG/DL (ref 65–140)
MRSA NOSE QL CULT: ABNORMAL
MRSA NOSE QL CULT: ABNORMAL
POTASSIUM SERPL-SCNC: 3.3 MMOL/L (ref 3.5–5.3)
SODIUM SERPL-SCNC: 138 MMOL/L (ref 136–145)

## 2018-06-22 PROCEDURE — 99239 HOSP IP/OBS DSCHRG MGMT >30: CPT | Performed by: HOSPITALIST

## 2018-06-22 PROCEDURE — 80048 BASIC METABOLIC PNL TOTAL CA: CPT | Performed by: HOSPITALIST

## 2018-06-22 RX ORDER — DOCUSATE SODIUM 100 MG/1
100 CAPSULE, LIQUID FILLED ORAL 2 TIMES DAILY
Qty: 10 CAPSULE | Refills: 0 | Status: SHIPPED | OUTPATIENT
Start: 2018-06-22

## 2018-06-22 RX ORDER — POTASSIUM CHLORIDE 20MEQ/15ML
40 LIQUID (ML) ORAL ONCE
Status: COMPLETED | OUTPATIENT
Start: 2018-06-22 | End: 2018-06-22

## 2018-06-22 RX ORDER — POTASSIUM CHLORIDE 29.8 MG/ML
40 INJECTION INTRAVENOUS ONCE
Status: DISCONTINUED | OUTPATIENT
Start: 2018-06-22 | End: 2018-06-22

## 2018-06-22 RX ADMIN — METOPROLOL TARTRATE 12.5 MG: 25 TABLET ORAL at 08:50

## 2018-06-22 RX ADMIN — ASPIRIN 81 MG: 81 TABLET, CHEWABLE ORAL at 08:50

## 2018-06-22 RX ADMIN — SERTRALINE HYDROCHLORIDE 50 MG: 50 TABLET ORAL at 08:50

## 2018-06-22 RX ADMIN — HEPARIN SODIUM 5000 UNITS: 5000 INJECTION, SOLUTION INTRAVENOUS; SUBCUTANEOUS at 05:39

## 2018-06-22 RX ADMIN — POTASSIUM CHLORIDE 40 MEQ: 20 SOLUTION ORAL at 13:03

## 2018-06-22 RX ADMIN — BUSPIRONE HYDROCHLORIDE 7.5 MG: 5 TABLET ORAL at 08:50

## 2018-06-22 RX ADMIN — DOCUSATE SODIUM 100 MG: 100 CAPSULE, LIQUID FILLED ORAL at 08:50

## 2018-06-22 RX ADMIN — HEPARIN SODIUM 5000 UNITS: 5000 INJECTION, SOLUTION INTRAVENOUS; SUBCUTANEOUS at 13:04

## 2018-06-22 RX ADMIN — FUROSEMIDE 20 MG: 20 TABLET ORAL at 08:50

## 2018-06-22 NOTE — SOCIAL WORK
CM [facility] to [facility]  Daughter stated plan is to [facility] under [facility]  Hospice   time for transport is 4:30 via BLS with [facility]  CM notified Precilla Carlos hospice liaison who visiting pt at hospital of DC time as well as [facility], pt's daughter [facility] and pt's physician

## 2018-06-22 NOTE — PROGRESS NOTES
Progress Note - Teja Hudson 6/19/1927, 80 y o  female MRN: [id number]    Unit/Bed#: -01 Encounter: [id number]    Primary Care Provider: Johan Yost DO   Date and time admitted to hospital: 6/20/2018  7:57 PM    * Constipation   Assessment & Plan    · Presents with diarrhea x2 days (likely overflow)  Recently on Bactrim and Keflex for a UTI  · CT Ab/Pelvis- Moderate to large amount of stool noted within the rectum  Mild perirectal edema is noted which can be seen in stercoral colitis  No free air or free fluid  Cholelithiasis  · Afebrile without leukocytosis- unlikely infectious diarrhea  · Start Colace, Dulcolax, Miralax  · Enemas PRN  Chronic diastolic heart failure (HCC)   Assessment & Plan    · Stable  · Daily weights, I&Os  · Fluid restriction  · Continue Lasix  Essential hypertension   Assessment & Plan    · Continue Lasix, Metoprolol  Hypokalemia   Assessment & Plan    · Improved today   · Replete and monitor with BMP daily         Dementia   Assessment & Plan    · Continue home medications  VTE Pharmacologic Prophylaxis:   Pharmacologic: Heparin  Mechanical VTE Prophylaxis in Place: Yes    Patient Centered Rounds: I have performed bedside rounds with nursing staff today  Discussions with Specialists or Other Care Team Provider: case management     Education and Discussions with Family / Patient: patient's dtr     Time Spent for Care: 30 minutes  More than 50% of total time spent on counseling and coordination of care as described above  Current Length of Stay: 2 day(s)    Current Patient Status: Inpatient   Certification Statement: The patient will continue to require additional inpatient hospital stay due to discharge planning     Discharge Plan / Estimated Discharge Date: Pt to be enrolled in hospice at her previous facility  If arrangements can be made, pt to be discharge today      Code Status: Level 3 - DNAR and DNI    Subjective:   Pt denies pain or discomfort in her abdomen  Objective:     Vitals:   Temp (24hrs), Av 4 °F (36 3 °C), Min:97 °F (36 1 °C), Max:97 8 °F (36 6 °C)    HR:  [66-74] 66  Resp:  [18] 18  BP: (121-132)/(75-84) 132/75  SpO2:  [91 %-94 %] 91 %  Body mass index is 17 58 kg/m²  Input and Output Summary (last 24 hours): Intake/Output Summary (Last 24 hours) at 18 1311  Last data filed at 18 0900   Gross per 24 hour   Intake               60 ml   Output              280 ml   Net             -220 ml       Physical Exam:     Physical Exam   Constitutional: No distress  Frail appearing    HENT:   Head: Normocephalic and atraumatic  Cardiovascular: Normal rate and regular rhythm  Pulmonary/Chest: Effort normal and breath sounds normal  No respiratory distress  She has no wheezes  Abdominal: Soft  Bowel sounds are normal  She exhibits no distension  There is no tenderness  There is no rebound  Musculoskeletal: Normal range of motion  Neurological: She is alert  Answers simple questions appropriately    Skin: Skin is warm and dry  She is not diaphoretic  Nursing note and vitals reviewed  Additional Data:     Labs:      Results from last 7 days  Lab Units 18  0530   WBC Thousand/uL 6 00   HEMOGLOBIN g/dL 12 8   HEMATOCRIT % 40 7   PLATELETS Thousands/uL 120*  119*   NEUTROS PCT % 68   LYMPHS PCT % 18   MONOS PCT % 11   EOS PCT % 3       Results from last 7 days  Lab Units 18  1100  18  2032   SODIUM mmol/L 138  < > 144   POTASSIUM mmol/L 3 3*  < > 3 0*   CHLORIDE mmol/L 102  < > 102   CO2 mmol/L 30  < > 34*   BUN mg/dL 19  < > 29*   CREATININE mg/dL 0 92  < > 1 03   CALCIUM mg/dL 8 9  < > 8 9   TOTAL PROTEIN g/dL  --   --  6 9   BILIRUBIN TOTAL mg/dL  --   --  0 80   ALK PHOS U/L  --   --  68   ALT U/L  --   --  26   AST U/L  --   --  32   GLUCOSE RANDOM mg/dL 191*  < > 101   < > = values in this interval not displayed          * I Have Reviewed All Lab Data Listed Above   * Additional Pertinent Lab Tests Reviewed: All Labs Within Last 24 Hours Reviewed    Imaging:    Imaging Reports Reviewed Today Include:   Imaging Personally Reviewed by Myself Includes:      Recent Cultures (last 7 days):           Last 24 Hours Medication List:     Current Facility-Administered Medications:  acetaminophen 650 mg Oral Q6H PRN QUINN Arvizu-GEOFF    albuterol 2 5 mg Nebulization Q6H PRN QUINN Arvizu-GEOFF    aspirin 81 mg Oral Daily QUINN Arvizu-GEOFF    bisacodyl 5 mg Oral Daily PRN QUINN Arvizu-GEOFF    busPIRone 7 5 mg Oral BID QUINN Arvizu-GEOFF    docusate sodium 100 mg Oral BID QUINN Arvizu-GEOFF    furosemide 20 mg Oral Daily QUINN Arvizu-GEOFF    furosemide 40 mg Oral Before Dinner Prosper Das PA-C    heparin (porcine) 5,000 Units Subcutaneous Q8H [facility] QUINN Arvizu-GEOFF    metoprolol tartrate 12 5 mg Oral Q12H [facility] Prosper Das PA-C    ondansetron 4 mg Intravenous Q6H PRN QUINN Arvizu-GEOFF    polyethylene glycol 17 g Oral Daily PRN QUINN Arvizu-C    polyvinyl alcohol 2 drop Both Eyes Q3H PRN Prosper Das PA-C    sodium chloride 50 mL/hr Intravenous Continuous Liban Lazo MD Last Rate: 50 mL/hr (06/21/18 1156)        Today, Patient Was Seen By: Liban Lazo MD    ** Please Note: This note has been constructed using a voice recognition system   **

## 2018-06-22 NOTE — DISCHARGE SUMMARY
Discharge- Willi Tristan 6/19/1927, 80 y o  female MRN: [id number]    Unit/Bed#: -01 Encounter: [id number]    Primary Care Provider: Carley Funk DO   Date and time admitted to hospital: 6/20/2018  7:57 PM    * Constipation   Assessment & Plan    · Presents with diarrhea x2 days (likely overflow)  Recently on Bactrim and Keflex for a UTI  · CT Ab/Pelvis- Moderate to large amount of stool noted within the rectum  Mild perirectal edema is noted which can be seen in stercoral colitis  No free air or free fluid  Cholelithiasis  · cont Colace, Dulcolax, Miralax  · Enemas PRN  Improved stool ouput (3 large BMs while admitted)  No abd pain or discomfor           Chronic diastolic heart failure (HCC)   Assessment & Plan    · Stable  · Daily weights, I&Os  · Fluid restriction  · Continue Lasix  Essential hypertension   Assessment & Plan    · Continue Lasix, Metoprolol  Hypokalemia   Assessment & Plan    · Replete and monitor with BMP daily         Dementia   Assessment & Plan    · Continue home medications  Discharging Physician / Practitioner: Dana Lei MD  PCP: Carley Funk DO  Admission Date:   Admission Orders     Ordered        06/20/18 2219  Inpatient Admission (expected length of stay for this patient is greater than two midnights)  Once             Discharge Date: 06/22/18    Disposition:      Short Term Rehab or SNF at [address] [place] [address] SNF:   · [facility]  - Not Applicable to this Patient    Reason for Admission: loose stool     Discharge Diagnoses:     Please see assessment and plan section above for further details regarding discharge diagnoses       Resolved Problems  Date Reviewed: 6/20/2018    None        Consultations During Hospital Stay:  · Case management/hospice     Procedures Performed:     · None     Medication Adjustments and Discharge Medications:  · Summary of Medication Adjustments made as a result of this hospitalization:   · Discontinue zoloft per discussion with family  · Medication Dosing Tapers - Please refer to Discharge Medication List for details on any medication dosing tapers (if applicable to patient)  · Medications being temporarily held (include recommended restart time): none  · Discharge Medication List: See after visit summary for reconciled discharge medications  Wound Care Recommendations:  When applicable, please see wound care section of After Visit Summary  Diet Recommendations at Discharge:   Diet -        Diet Orders            Start     Ordered    06/22/18 0751  Diet Dysphagia/Modified Consistency; Dysphagia 1-Pureed; Thin Liquid; Fluid Restriction 1500 ML, Sodium 2 Gm  Diet effective now     Question Answer Comment   Diet Type Dysphagia/Modified Consistency    Dysphagia/Modified Consistency Dysphagia 1-Pureed    Liquid Modifier Thin Liquid    Other Restriction(s): Fluid Restriction 1500 ML    Other Restriction(s): Sodium 2 Gm    RD to adjust diet per protocol? Yes        06/22/18 0752    06/21/18 0648  Room Service  Once     Question:  Type of Service  Answer:  Room Service - Appropriate with Assistance    06/21/18 0647    06/21/18 0059  Dietary nutrition supplements  Once     Question Answer Comment   Select Supplement: Ensure Enlive-Chocolate    Frequency Breakfast, Lunch, Dinner        06/21/18 0059        Fluid Restriction - No Fluid Restriction at Discharge  Instructions for any Catheters / Lines Present at Discharge (including removal date, if applicable):     Significant Findings / Test Results:   CT abd/pelvis: (6/20/18)   IMPRESSION:  Moderate to large amount of stool noted within the rectum  Mild perirectal edema is noted which can be seen in stercoral colitis    No free air or free fluid    Cholelithiasis      Incidental Findings:   · none     Test Results Pending at Discharge (will require follow up):   · none     Outpatient Tests Requested:  · None Complications:  None     Hospital Course:     Jovan Weathers is a 80 y o  female patient who originally presented to the hospital on 6/20/2018 due to concern for diarrhea  Patient had recently been on antibiotics for UTI  After discontinuation her family noted she has been having loose watery stools daily  Patient was brought to the ED and imaging in the ED actually showed a large amount of stool within the rectum and possible stercoral colitis  Patient was started on aggressive bowel regimen with suppositories and enemas as needed  Throughout the course of her stay she had 3 large bowel movements  They are described a semi formed and soft  No further episodes of diarrhea  Potassium was monitored and required repletion at times  After discussion with patient's family they had been considering patient for hospice level of care  I believe this would be appropriate given patient's level of debility  Hospice liaison has seen and evaluated the patient  Patient's family aware of decision to send back to [facility] on hospice  Condition at Discharge: stable     Discharge Day Visit / Exam:     * Please refer to separate progress note for these details *    Goals of Care Discussions:  · Code Status at Discharge: Level 3 - DNAR and DNI  · Were there any Goals of Care Discussions during Hospitalization?: No  · Results of any General Goals of Care Discussions: n/a    · POLST Completed: No   · If POLST Completed, Summary of POLST Agreement Provided Here: n/a   · OK to Rehospitalize if Needed? No    Discharge instructions/Information to patient and family:   See after visit summary section titled Discharge Instructions for information provided to patient and family  Planned Readmission: none      Discharge Statement:  I spent 35 minutes discharging the patient  This time was spent on the day of discharge  I had direct contact with the patient on the day of discharge   Greater than 50% of the total time was spent examining patient, answering all patient questions, arranging and discussing plan of care with patient as well as directly providing post-discharge instructions  Additional time then spent on discharge activities      ** Please Note: This note has been constructed using a voice recognition system **

## 2018-06-22 NOTE — ASSESSMENT & PLAN NOTE
· Presents with diarrhea x2 days (likely overflow)  Recently on Bactrim and Keflex for a UTI  · CT Ab/Pelvis- Moderate to large amount of stool noted within the rectum  Mild perirectal edema is noted which can be seen in stercoral colitis  No free air or free fluid  Cholelithiasis  · cont Colace, Dulcolax, Miralax  · Enemas PRN  Improved stool ouput (3 large BMs while admitted)   No abd pain or discomfor

## 2018-06-23 NOTE — CASE MANAGEMENT
Notification of Discharge  This is a Notification of Discharge from our facility [address]  Please be advised that this patient has been discharge from our facility  Below you will find the admission and discharge date and time including the patients disposition  PRESENTATION DATE: 6/20/2018  7:57 PM  IP ADMISSION DATE: 6/20/18 2219  DISCHARGE DATE: 6/22/2018  5:32 PM  DISPOSITION: Non [facility] SNF/TCU/SNU    [address] [facility] in the [facility] by Sukumar Tran for 2017  Network Utilization Review Department  Phone: [phone]; Fax [phone]  ATTENTION: The Network Utilization Review Department is now centralized for our 7 Facilities  Make a note that we have a new phone and fax numbers for our Department  Please call with any questions or concerns to [phone] and carefully follow the prompts so that you are directed to the right person  All voicemails are confidential  Fax any determinations, approvals, denials, and requests for initial or continue stay review clinical to [phone]  Due to HIGH CALL volume, it would be easier if you could please send faxed requests to expedite your requests and in part, help us provide discharge notifications faster

## 2018-10-02 DIAGNOSIS — M19.90 ARTHRITIS: Primary | ICD-10-CM

## 2018-10-11 RX ORDER — ACETAMINOPHEN 325 MG/1
TABLET ORAL
Qty: 120 TABLET | Refills: 4 | Status: SHIPPED | OUTPATIENT
Start: 2018-10-11